# Patient Record
Sex: FEMALE | Race: WHITE | Employment: UNEMPLOYED | URBAN - NONMETROPOLITAN AREA
[De-identification: names, ages, dates, MRNs, and addresses within clinical notes are randomized per-mention and may not be internally consistent; named-entity substitution may affect disease eponyms.]

---

## 2022-02-01 ENCOUNTER — HOSPITAL ENCOUNTER (INPATIENT)
Age: 37
LOS: 6 days | Discharge: HOME | DRG: 885 | End: 2022-02-07
Attending: PSYCHIATRY & NEUROLOGY | Admitting: PSYCHIATRY & NEUROLOGY
Payer: COMMERCIAL

## 2022-02-01 PROCEDURE — 1240000000 HC EMOTIONAL WELLNESS R&B

## 2022-02-01 RX ORDER — OLANZAPINE 10 MG/1
10 TABLET ORAL NIGHTLY
Status: ON HOLD | COMMUNITY
End: 2022-02-07 | Stop reason: SDUPTHER

## 2022-02-01 RX ORDER — GABAPENTIN 600 MG/1
600 TABLET ORAL 3 TIMES DAILY
COMMUNITY

## 2022-02-01 RX ORDER — DULOXETIN HYDROCHLORIDE 30 MG/1
30 CAPSULE, DELAYED RELEASE ORAL DAILY
Status: ON HOLD | COMMUNITY
End: 2022-02-07 | Stop reason: HOSPADM

## 2022-02-01 RX ORDER — TRAZODONE HYDROCHLORIDE 50 MG/1
50 TABLET ORAL NIGHTLY
Status: ON HOLD | COMMUNITY
End: 2022-02-07 | Stop reason: SDUPTHER

## 2022-02-01 RX ORDER — ACETAMINOPHEN 325 MG/1
650 TABLET ORAL EVERY 4 HOURS PRN
Status: DISCONTINUED | OUTPATIENT
Start: 2022-02-01 | End: 2022-02-07 | Stop reason: HOSPADM

## 2022-02-01 RX ORDER — NICOTINE 21 MG/24HR
1 PATCH, TRANSDERMAL 24 HOURS TRANSDERMAL DAILY
Status: DISCONTINUED | OUTPATIENT
Start: 2022-02-01 | End: 2022-02-07 | Stop reason: HOSPADM

## 2022-02-01 RX ORDER — DOCUSATE SODIUM 100 MG/1
100 CAPSULE, LIQUID FILLED ORAL NIGHTLY
COMMUNITY

## 2022-02-01 RX ORDER — POLYETHYLENE GLYCOL 3350 17 G/17G
17 POWDER, FOR SOLUTION ORAL DAILY PRN
Status: DISCONTINUED | OUTPATIENT
Start: 2022-02-01 | End: 2022-02-07 | Stop reason: HOSPADM

## 2022-02-01 RX ORDER — ACETAMINOPHEN 325 MG/1
650 TABLET ORAL EVERY 4 HOURS PRN
Status: DISCONTINUED | OUTPATIENT
Start: 2022-02-01 | End: 2022-02-01

## 2022-02-01 RX ORDER — M-VIT,TX,IRON,MINS/CALC/FOLIC 27MG-0.4MG
1 TABLET ORAL DAILY
COMMUNITY

## 2022-02-01 RX ORDER — METRONIDAZOLE 500 MG/1
500 TABLET ORAL 2 TIMES DAILY
COMMUNITY

## 2022-02-01 ASSESSMENT — SLEEP AND FATIGUE QUESTIONNAIRES
DIFFICULTY STAYING ASLEEP: YES
RESTFUL SLEEP: YES
SLEEP PATTERN: DIFFICULTY FALLING ASLEEP;DISTURBED/INTERRUPTED SLEEP;RESTLESSNESS
DO YOU USE A SLEEP AID: YES
AVERAGE NUMBER OF SLEEP HOURS: 6
DIFFICULTY FALLING ASLEEP: YES
DIFFICULTY ARISING: NO
DO YOU HAVE DIFFICULTY SLEEPING: YES

## 2022-02-01 ASSESSMENT — LIFESTYLE VARIABLES: HISTORY_ALCOHOL_USE: NO

## 2022-02-01 ASSESSMENT — PATIENT HEALTH QUESTIONNAIRE - PHQ9: SUM OF ALL RESPONSES TO PHQ QUESTIONS 1-9: 18

## 2022-02-01 NOTE — GROUP NOTE
Group Therapy Note    Date: 2/1/2022    Group Start Time: 1600  Group End Time: 1700  Group Topic: Healthy Living/Wellness    [facility]    Matthew Conner RN                  Patient's Goal:  Safety Plan      Status After Intervention:  Improved    Participation Level: Active Listener and Interactive    Participation Quality: Appropriate and Attentive      Speech:  normal      Thought Process/Content: Logical  Linear      Affective Functioning: Congruent      Mood: anxious      Level of consciousness:  Alert and Oriented x4      Response to Learning: Able to verbalize current knowledge/experience and Able to verbalize/acknowledge new learning      Endings: None Reported    Modes of Intervention: Education      Discipline Responsible: Registered Nurse      Signature:   Matthew Conner RN
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
## 2022-02-02 PROBLEM — F33.3 SEVERE RECURRENT MAJOR DEPRESSION WITH PSYCHOTIC FEATURES (HCC): Status: ACTIVE | Noted: 2022-02-02

## 2022-02-02 PROBLEM — F12.20 CANNABIS USE DISORDER, MODERATE, DEPENDENCE (HCC): Status: ACTIVE | Noted: 2022-02-02

## 2022-02-02 PROCEDURE — 6370000000 HC RX 637 (ALT 250 FOR IP): Performed by: NURSE PRACTITIONER

## 2022-02-02 PROCEDURE — 1240000000 HC EMOTIONAL WELLNESS R&B

## 2022-02-02 PROCEDURE — 6370000000 HC RX 637 (ALT 250 FOR IP): Performed by: PSYCHIATRY & NEUROLOGY

## 2022-02-02 PROCEDURE — 90792 PSYCH DIAG EVAL W/MED SRVCS: CPT | Performed by: NURSE PRACTITIONER

## 2022-02-02 RX ORDER — GABAPENTIN 600 MG/1
600 TABLET ORAL 3 TIMES DAILY
Status: DISCONTINUED | OUTPATIENT
Start: 2022-02-02 | End: 2022-02-02

## 2022-02-02 RX ORDER — GABAPENTIN 600 MG/1
600 TABLET ORAL 2 TIMES DAILY
Status: DISCONTINUED | OUTPATIENT
Start: 2022-02-02 | End: 2022-02-02

## 2022-02-02 RX ORDER — TRAZODONE HYDROCHLORIDE 50 MG/1
50 TABLET ORAL NIGHTLY
Status: DISCONTINUED | OUTPATIENT
Start: 2022-02-02 | End: 2022-02-07 | Stop reason: HOSPADM

## 2022-02-02 RX ORDER — DOCUSATE SODIUM 100 MG/1
100 CAPSULE, LIQUID FILLED ORAL NIGHTLY
Status: DISCONTINUED | OUTPATIENT
Start: 2022-02-02 | End: 2022-02-07 | Stop reason: HOSPADM

## 2022-02-02 RX ORDER — OLANZAPINE 10 MG/1
10 TABLET ORAL NIGHTLY
Status: DISCONTINUED | OUTPATIENT
Start: 2022-02-02 | End: 2022-02-07 | Stop reason: HOSPADM

## 2022-02-02 RX ORDER — IBUPROFEN 400 MG/1
400 TABLET ORAL EVERY 6 HOURS PRN
Status: DISCONTINUED | OUTPATIENT
Start: 2022-02-02 | End: 2022-02-07 | Stop reason: HOSPADM

## 2022-02-02 RX ORDER — DULOXETIN HYDROCHLORIDE 30 MG/1
30 CAPSULE, DELAYED RELEASE ORAL DAILY
Status: DISCONTINUED | OUTPATIENT
Start: 2022-02-02 | End: 2022-02-07 | Stop reason: HOSPADM

## 2022-02-02 RX ADMIN — IBUPROFEN 400 MG: 400 TABLET, FILM COATED ORAL at 14:34

## 2022-02-02 RX ADMIN — IBUPROFEN 400 MG: 400 TABLET, FILM COATED ORAL at 20:53

## 2022-02-02 RX ADMIN — ACETAMINOPHEN 650 MG: 325 TABLET ORAL at 07:52

## 2022-02-02 RX ADMIN — OLANZAPINE 10 MG: 10 TABLET, FILM COATED ORAL at 20:50

## 2022-02-02 RX ADMIN — DULOXETINE 30 MG: 30 CAPSULE, DELAYED RELEASE ORAL at 10:59

## 2022-02-02 RX ADMIN — GABAPENTIN 600 MG: 600 TABLET, FILM COATED ORAL at 13:25

## 2022-02-02 RX ADMIN — DOCUSATE SODIUM 100 MG: 100 CAPSULE, LIQUID FILLED ORAL at 20:51

## 2022-02-02 RX ADMIN — TRAZODONE HYDROCHLORIDE 50 MG: 50 TABLET ORAL at 20:50

## 2022-02-02 ASSESSMENT — PAIN DESCRIPTION - LOCATION: LOCATION: BACK

## 2022-02-02 ASSESSMENT — PAIN SCALES - GENERAL
PAINLEVEL_OUTOF10: 6
PAINLEVEL_OUTOF10: 4
PAINLEVEL_OUTOF10: 2
PAINLEVEL_OUTOF10: 5

## 2022-02-02 ASSESSMENT — PAIN DESCRIPTION - ORIENTATION: ORIENTATION: LOWER

## 2022-02-02 ASSESSMENT — PAIN DESCRIPTION - ONSET: ONSET: ON-GOING

## 2022-02-02 ASSESSMENT — PAIN DESCRIPTION - PAIN TYPE: TYPE: ACUTE PAIN

## 2022-02-02 ASSESSMENT — PAIN - FUNCTIONAL ASSESSMENT: PAIN_FUNCTIONAL_ASSESSMENT: ACTIVITIES ARE NOT PREVENTED

## 2022-02-02 ASSESSMENT — PAIN DESCRIPTION - DESCRIPTORS: DESCRIPTORS: ACHING

## 2022-02-02 ASSESSMENT — PAIN DESCRIPTION - FREQUENCY: FREQUENCY: INTERMITTENT

## 2022-02-02 ASSESSMENT — PAIN DESCRIPTION - PROGRESSION: CLINICAL_PROGRESSION: NOT CHANGED

## 2022-02-02 NOTE — PLAN OF CARE
Problem: Depressive Behavior With or Without Suicide Precautions:  Goal: Able to verbalize acceptance of life and situations over which he or she has no control  2/2/2022 1208 by Meche Horan  Outcome: Ongoing      Group Therapy Note     Date: 2/2/2022  Start Time: 1100  End Time:  1130  Number of Participants: 9     Type of Group: Psychoeducation     Wellness Binder Information  Module Name:  emotional wellness  Session Number:  1     Patient's Goal:  validation of feelings     Notes:  pt acknowledged to have feelings validated it may be necessary to share feeling with others.      Status After Intervention:  Improved     Participation Level: Interactive     Participation Quality: Appropriate, Attentive, and Sharing        Speech:  normal        Thought Process/Content: Logical        Affective Functioning: Congruent        Mood: congruent        Level of consciousness:  Alert, Oriented x4, and Attentive        Response to Learning: Able to verbalize current knowledge/experience        Endings: None Reported     Modes of Intervention: Education        Discipline Responsible: Psychoeducational Specialist        Signature:  Meche Horan

## 2022-02-02 NOTE — PROGRESS NOTES
SW placed a call to get collateral from patients mom Faisal Colin and there was not a voice mail set up to leave a message at [phone]

## 2022-02-02 NOTE — PROGRESS NOTES
Called patient's pharmacy as per provider and double checked patient's prescription for Neurontin. Pharmacist confirmed patient last picked up medication on 12/31/2021.

## 2022-02-02 NOTE — H&P
Behavioral Services  Medicare Certification Upon Admission    I certify that this patient's inpatient psychiatric hospital admission is medically necessary for:    [x] (1) Treatment which could reasonably be expected to improve this patient's condition,       [] (2) Or for diagnostic study;     AND     [x](2) The inpatient psychiatric services are provided while the individual is under the care of a physician and are included in the individualized plan of care.     Estimated length of stay/service 3 days- 5 weeks    Plan for post-hospital care: TBA    Electronically signed by ALAN Smiley on 2/2/2022 at 10:53 AM

## 2022-02-02 NOTE — PLAN OF CARE
Problem: Depressive Behavior With or Without Suicide Precautions:  Goal: Absence of self-harm  Description: Absence of self-harm  Outcome: Met This Shift     Problem: Depressive Behavior With or Without Suicide Precautions:  Goal: Able to verbalize acceptance of life and situations over which he or she has no control  Description: Able to verbalize acceptance of life and situations over which he or she has no control  Outcome: Ongoing  Goal: Able to verbalize and/or display a decrease in depressive symptoms  Description: Able to verbalize and/or display a decrease in depressive symptoms  Outcome: Ongoing  Goal: Ability to disclose and discuss suicidal ideas will improve  Description: Ability to disclose and discuss suicidal ideas will improve  Outcome: Ongoing  Goal: Able to verbalize support systems  Description: Able to verbalize support systems  Outcome: Ongoing  Goal: Patient specific goal  Description: Patient specific goal  Outcome: Ongoing  Goal: Participates in care planning  Description: Participates in care planning  Outcome: Ongoing     Problem: Pain:  Goal: Pain level will decrease  Description: Pain level will decrease  Outcome: Ongoing  Goal: Control of acute pain  Description: Control of acute pain  Outcome: Ongoing  Goal: Control of chronic pain  Description: Control of chronic pain  Outcome: Ongoing

## 2022-02-02 NOTE — PROGRESS NOTES
Group Therapy Note    Start Time: 800  End Time:  651  Number of Participants: 9    Type of Group: Community Meeting       Patient's Goal:  \"resting\"         Notes:      Participation Level:  Active Listener       Participation Quality: Appropriate      Thought Process/Content: Logical      Affective Functioning: Congruent      Mood: calm      Level of consciousness:  Alert      Modes of Intervention: Support      Discipline Responsible: Behavioral Health Tech II      Signature:  Temo Chambers

## 2022-02-02 NOTE — PROGRESS NOTES
[facility] Adult Unit Daily Assessment  Nursing Progress Note    Room: [facility]/606-01   Name: Kaylah Hoffman   Age: 29 y.o. Gender: female   Dx: <principal problem not specified>  Precautions: suicide risk  Inpatient Status: voluntary       SLEEP:    Sleep Quality Fair  Sleep Medications: No   PRN Sleep Meds: No       MEDICAL:    Other PRN Meds: No   Med Compliant: Yes  Accu-Chek: No  Oxygen/CPAP/BiPAP: No  CIWA/CINA: No   PAIN Assessment: none  Side Effects from medication: No    Is Patient experiencing any respiratory symptoms (headache, fever, body aches, cough. Abdi Fernandez ): no  Patient educated by nursing to practice social distancing, wear masks, wash hands frequently: yes      PSYCH:    Depression: 3   Anxiety: 3   SI denies suicidal ideation   HI Negative for homicidal ideation      AVH:Absent      GENERAL:    Appetite: no change from normal    Social: No   Speech: normal   Appearance: appropriately dressed    GROUP:    Group Participation: No  Participation Quality: None    Notes:   Patient has remained in her room most of the shift. Patient stated she was having increased depression as her reason for being a patient. Patient has not complained of any pain and did not have any medications ordered tonight. Patient sat up on the side of the bed and talked for a short time with this nurse and then laid back down and is resting in bed at this time with eyes closed. Will continue to monitor for safety.           Electronically signed by Oscar Jang RN on 2/1/22 at 11:20 PM CST

## 2022-02-02 NOTE — PLAN OF CARE
Problem: Depressive Behavior With or Without Suicide Precautions:  Goal: Able to verbalize acceptance of life and situations over which he or she has no control  Description: Able to verbalize acceptance of life and situations over which he or she has no control  2/2/2022 1501 by Donny Curry RN  Outcome: Ongoing  2/2/2022 1208 by Ralph Velez  Outcome: Ongoing  Goal: Able to verbalize and/or display a decrease in depressive symptoms  Description: Able to verbalize and/or display a decrease in depressive symptoms  Outcome: Ongoing  Goal: Ability to disclose and discuss suicidal ideas will improve  Description: Ability to disclose and discuss suicidal ideas will improve  Outcome: Ongoing  Goal: Able to verbalize support systems  Description: Able to verbalize support systems  Outcome: Ongoing  Goal: Absence of self-harm  Description: Absence of self-harm  Outcome: Ongoing  Goal: Patient specific goal  Description: Patient specific goal  Outcome: Ongoing  Goal: Participates in care planning  Description: Participates in care planning  Outcome: Ongoing     Problem: Pain:  Goal: Pain level will decrease  Description: Pain level will decrease  Outcome: Ongoing  Goal: Control of acute pain  Description: Control of acute pain  Outcome: Ongoing  Goal: Control of chronic pain  Description: Control of chronic pain  Outcome: Ongoing

## 2022-02-02 NOTE — PROGRESS NOTES
Requirement Note     SW met with pt to complete Psychosocial and CSSR-S on this date. Patients long and short term goals discussed. Patient voiced understanding. Treatment plan sheet signed. Patient verbalized understanding of the treatment plan. Patient participated in goals and objectives of the treatment plan. Patient discussed safety plan with . SW explained treatment goals with pt. Decreasing depression and anxiety by improvement of positive coping patterns was discussed. Pt acknowledged understanding of treatment goals and signed treatment plan signature sheet. In the last 6 months has the pt been a danger to self: YES  In the last 6 months has the pt been a danger to others: NO  Legal Guardian/POA: NO     Provided patient with [employer] handout entitled \"Quitting Smoking. \"  Reviewed handout with patient: addressing dangers of smoking, developing coping skills, and providing basic information about quitting. Patient received all components practical counseling of tobacco practical counseling during the hospital stay.

## 2022-02-02 NOTE — PROGRESS NOTES
BHI Daily Shift Assessment  Nursing Progress Note    Room: 0606/606-01 Name: Osiris Powers Age: 29 y.o. Gender: female   Dx: Severe recurrent major depression with psychotic features ([address]  Precautions: suicide risk  Target Symptoms:   Accu-Chek: NoSleep: Yes,Sleep Quality Good SI No AVH Denies   [facility]  ADLs: No Speech: normal Depression: 3 Anxiety: 4   Participation LevelActive Listener and Interactive  Appetite: Good  Respiratory symptoms: No Headache: No Body aches: No Fever: No Cough: No  Patients encouraged to wear masks, wash hands frequently and practice social distancing while on the unit: Yes  Visitation: No   Participation QualityAppropriate and Attentive    Complaints:    Notes: patient stated that she was feeling depressed and when she feels like this she goes to bed and takes a nap. This is what patient was doing during the afternoon after lunch. Interviewed patient in her room. Patient rates her depression as a 3 and her anxiety as a 4. Patient says that her nap helped her depression. Patient denies SI, HI and AVH. Patient says her appetite was good and she had a good night's sleep due to the zyprexa she took. Will continue to monitor for safety.      Signature:

## 2022-02-02 NOTE — PROGRESS NOTES
Admission Note      Reason for admission/Target Symptom: Patient admitted to [facility] due to under the care of Maude Mendoza  arrived on unit via [facility] with security and staff from [place] Patient arrived dressed in paper scrubs:  no.   Body assessment and safety check completed by Haja Mcnamara and Janny Aguilera. and  yes umer discovered shoes with string, pants with string, and hoodie with string  Pt was provided with paper scrubs and clothes from donation closet. Patient belongings and valuables was cataloged and accounted for by Charla MILTON  Diagnoses: Depression NOS  UDS: Amphetamine, Methampetamine, THC  BAL:  Neg    SW met with treatment team to discuss patient's treatment including care planning, discharge planning, and follow-up needs. Pt has been admitted to [facility]. Treatment team has identified patient's discharge needs as medication management and outpatient therapy/counseling. Pt confirmed  the need for ongoing treatment post inpatient stay. Pt was also provided a handout of contact information for drug and alcohol treatment centers and other community support service such as [facility], AA, and [facility].

## 2022-02-03 LAB
HCG(URINE) PREGNANCY TEST: NEGATIVE
TSH REFLEX FT4: 2.32 UIU/ML (ref 0.35–5.5)
VITAMIN B-12: 709 PG/ML (ref 211–946)
VITAMIN D 25-HYDROXY: 20.7 NG/ML

## 2022-02-03 PROCEDURE — 6370000000 HC RX 637 (ALT 250 FOR IP): Performed by: NURSE PRACTITIONER

## 2022-02-03 PROCEDURE — 99231 SBSQ HOSP IP/OBS SF/LOW 25: CPT | Performed by: NURSE PRACTITIONER

## 2022-02-03 PROCEDURE — 6370000000 HC RX 637 (ALT 250 FOR IP): Performed by: FAMILY MEDICINE

## 2022-02-03 PROCEDURE — 82306 VITAMIN D 25 HYDROXY: CPT

## 2022-02-03 PROCEDURE — 84703 CHORIONIC GONADOTROPIN ASSAY: CPT

## 2022-02-03 PROCEDURE — 1240000000 HC EMOTIONAL WELLNESS R&B

## 2022-02-03 PROCEDURE — 6370000000 HC RX 637 (ALT 250 FOR IP): Performed by: PSYCHIATRY & NEUROLOGY

## 2022-02-03 PROCEDURE — 84443 ASSAY THYROID STIM HORMONE: CPT

## 2022-02-03 PROCEDURE — 36415 COLL VENOUS BLD VENIPUNCTURE: CPT

## 2022-02-03 PROCEDURE — 82607 VITAMIN B-12: CPT

## 2022-02-03 RX ORDER — M-VIT,TX,IRON,MINS/CALC/FOLIC 27MG-0.4MG
1 TABLET ORAL DAILY
Status: DISCONTINUED | OUTPATIENT
Start: 2022-02-03 | End: 2022-02-07 | Stop reason: HOSPADM

## 2022-02-03 RX ORDER — ERGOCALCIFEROL 1.25 MG/1
50000 CAPSULE ORAL WEEKLY
Status: DISCONTINUED | OUTPATIENT
Start: 2022-02-03 | End: 2022-02-07 | Stop reason: HOSPADM

## 2022-02-03 RX ADMIN — ERGOCALCIFEROL 50000 UNITS: 1.25 CAPSULE ORAL at 10:24

## 2022-02-03 RX ADMIN — Medication 1 TABLET: at 12:15

## 2022-02-03 RX ADMIN — TRAZODONE HYDROCHLORIDE 50 MG: 50 TABLET ORAL at 20:10

## 2022-02-03 RX ADMIN — IBUPROFEN 400 MG: 400 TABLET, FILM COATED ORAL at 13:59

## 2022-02-03 RX ADMIN — OLANZAPINE 10 MG: 10 TABLET, FILM COATED ORAL at 20:09

## 2022-02-03 RX ADMIN — DULOXETINE 30 MG: 30 CAPSULE, DELAYED RELEASE ORAL at 08:28

## 2022-02-03 RX ADMIN — IBUPROFEN 400 MG: 400 TABLET, FILM COATED ORAL at 20:15

## 2022-02-03 RX ADMIN — POLYETHYLENE GLYCOL 3350 17 G: 17 POWDER, FOR SOLUTION ORAL at 12:15

## 2022-02-03 RX ADMIN — IBUPROFEN 400 MG: 400 TABLET, FILM COATED ORAL at 08:30

## 2022-02-03 RX ADMIN — DOCUSATE SODIUM 100 MG: 100 CAPSULE, LIQUID FILLED ORAL at 20:10

## 2022-02-03 ASSESSMENT — PAIN DESCRIPTION - DESCRIPTORS
DESCRIPTORS: ACHING
DESCRIPTORS: DISCOMFORT;DULL
DESCRIPTORS: DISCOMFORT;DULL

## 2022-02-03 ASSESSMENT — PAIN - FUNCTIONAL ASSESSMENT
PAIN_FUNCTIONAL_ASSESSMENT: ACTIVITIES ARE NOT PREVENTED

## 2022-02-03 ASSESSMENT — PAIN DESCRIPTION - PAIN TYPE
TYPE: ACUTE PAIN

## 2022-02-03 ASSESSMENT — PAIN DESCRIPTION - ORIENTATION
ORIENTATION: POSTERIOR
ORIENTATION: LEFT;UPPER
ORIENTATION: POSTERIOR

## 2022-02-03 ASSESSMENT — PAIN DESCRIPTION - ONSET
ONSET: ON-GOING

## 2022-02-03 ASSESSMENT — PAIN DESCRIPTION - FREQUENCY
FREQUENCY: INTERMITTENT

## 2022-02-03 ASSESSMENT — PAIN DESCRIPTION - LOCATION
LOCATION: TEETH
LOCATION: NECK
LOCATION: NECK

## 2022-02-03 ASSESSMENT — PAIN SCALES - GENERAL
PAINLEVEL_OUTOF10: 5
PAINLEVEL_OUTOF10: 4
PAINLEVEL_OUTOF10: 4
PAINLEVEL_OUTOF10: 2

## 2022-02-03 ASSESSMENT — PAIN DESCRIPTION - PROGRESSION
CLINICAL_PROGRESSION: NOT CHANGED
CLINICAL_PROGRESSION: GRADUALLY WORSENING
CLINICAL_PROGRESSION: GRADUALLY IMPROVING

## 2022-02-03 NOTE — PLAN OF CARE
Problem: Depressive Behavior With or Without Suicide Precautions:  Goal: Able to verbalize acceptance of life and situations over which he or she has no control  Description: Able to verbalize acceptance of life and situations over which he or she has no control  2/3/2022 1331 by Leanne Aschoff, RN  Outcome: Ongoing  2/3/2022 1218 by Ruth Hagen LPC  Outcome: Ongoing  2/3/2022 0536 by Chasidy Thompson RN  Outcome: Ongoing  Goal: Able to verbalize and/or display a decrease in depressive symptoms  Description: Able to verbalize and/or display a decrease in depressive symptoms  2/3/2022 1331 by Leanne Aschoff, RN  Outcome: Ongoing  2/3/2022 0536 by Chasidy Thompson RN  Outcome: Ongoing  Goal: Ability to disclose and discuss suicidal ideas will improve  Description: Ability to disclose and discuss suicidal ideas will improve  2/3/2022 1331 by Leanne Aschoff, RN  Outcome: Ongoing  2/3/2022 0536 by Chasidy Thompson RN  Outcome: Ongoing  Goal: Able to verbalize support systems  Description: Able to verbalize support systems  2/3/2022 1331 by Leanne Aschoff, RN  Outcome: Ongoing  2/3/2022 0536 by Chasidy Thompson RN  Outcome: Ongoing  Goal: Absence of self-harm  Description: Absence of self-harm  2/3/2022 1331 by Leanne Aschoff, RN  Outcome: Ongoing  2/3/2022 0536 by Chaisdy Thompson RN  Outcome: Ongoing  Goal: Patient specific goal  Description: Patient specific goal  2/3/2022 1331 by Leanne Aschoff, RN  Outcome: Ongoing  2/3/2022 0536 by Chasidy Thompson RN  Outcome: Ongoing  Goal: Participates in care planning  Description: Participates in care planning  2/3/2022 1331 by Leanne Aschoff, RN  Outcome: Ongoing  2/3/2022 0536 by Chasidy Thompson RN  Outcome: Ongoing

## 2022-02-03 NOTE — PROGRESS NOTES
History     Substance and Sexual Activity   Drug Use Not Currently        Social History     Tobacco Use   Smoking Status Current Every Day Smoker    Packs/day: 1.00    Types: Cigarettes   Smokeless Tobacco Never Used        Family History:     No family history on file. Mental Status:  Level of consciousness:  within normal limits and awake  Appearance:  well-appearing, street clothes, in chair, good grooming and good hygiene  Behavior/Motor:  no abnormalities noted  Attitude toward examiner:  cooperative, attentive and good eye contact  Speech:  normal rate and normal volume  Mood:  \" I am good. \"  Affect:  blunted and flat  Thought processes:  linear and goal directed  Thought content:  Homocidal ideation :deniess  Suicidal Ideation:  denies suicidal ideation  Delusions:  no evidence of delusions  Perceptual Disturbance:  denies any perceptual disturbance  Cognition:  oriented to person, place, and time  Concentration : good  Memory intact for recent and remote  Fund of knowledge: average  Abstract thinking:  adequate  Insight: improved  Judgment:  good     vitamin D  50,000 Units Oral Weekly    docusate sodium  100 mg Oral Nightly    DULoxetine  30 mg Oral Daily    OLANZapine  10 mg Oral Nightly    traZODone  50 mg Oral Nightly    nicotine  1 patch TransDERmal Daily       Current Medications:  Current Facility-Administered Medications   Medication Dose Route Frequency Provider Last Rate Last Admin    vitamin D (ERGOCALCIFEROL) capsule 50,000 Units  50,000 Units Oral Weekly Abdelrahman Regan MD   50,000 Units at 02/03/22 1024    docusate sodium (COLACE) capsule 100 mg  100 mg Oral Nightly Phyllis Console, APRN   100 mg at 02/02/22 2051    DULoxetine (CYMBALTA) extended release capsule 30 mg  30 mg Oral Daily Phyllis Console, APRN   30 mg at 02/03/22 0828    OLANZapine (ZYPREXA) tablet 10 mg  10 mg Oral Nightly Phyllis Console, APRN   10 mg at 02/02/22 2050    traZODone ([facility]) tablet 50 mg  50 mg Oral Nightly ALAN Tucker   50 mg at 02/02/22 2050    ibuprofen (ADVIL;MOTRIN) tablet 400 mg  400 mg Oral Q6H PRN ALAN Tucker   400 mg at 02/03/22 0830    polyethylene glycol (GLYCOLAX) packet 17 g  17 g Oral Daily PRN Tico Lopez MD        nicotine (NICODERM CQ) 21 MG/24HR 1 patch  1 patch TransDERmal Daily Tico Lopez MD   1 patch at 02/02/22 0752    acetaminophen (TYLENOL) tablet 650 mg  650 mg Oral Q4H PRN Portia Lyon MD   650 mg at 02/02/22 8653       Psychotherapy:   SUPPORTIVE    DSM V Diagnoses:      Principal Problem:    Severe recurrent major depression with psychotic features ([address]  Active Problems:    Cannabis use disorder, moderate, dependence ([address]  Resolved Problems:    * No resolved hospital problems. *            Plan:    Encourage group therapy  15 minute safety checks  Medical monitoring by Dr. Ewing Galeazzi and associates  Continue current therapy and medications  Possible dc Monday- pt reports social work assisting with getting patient to [facility] and [facility] in [address]? Amount of time spent with patient:  15 minutes with greater than 50% of the time spent in counseling and collaboration of care.     ALAN Tucker  Clinician Signature: signed electronically

## 2022-02-03 NOTE — PROGRESS NOTES
Group Therapy Note     Date: 02/03/22  Start Time: 0800  End Time:0830     Number of Participants: 13/15     Type of Group: community morning group        Status After Intervention:  Improved     Participation Level:  Active Listener and Interactive     Participation Quality: Appropriate and Attentive     Speech:  normal     Thought Process/Content: Logical  Linear     Affective Functioning: Congruent     Mood: calm     Level of consciousness:  Oriented x4     Response to Learning: Able to verbalize current knowledge/experience, Able to verbalize/acknowledge new learning, Able to retain information and Capable of insight     Modes of Intervention: Education and Support     Discipline Responsible: Registered Nurse     Signature:  Garry Aguiar RN

## 2022-02-03 NOTE — PLAN OF CARE
Problem: Depressive Behavior With or Without Suicide Precautions:  Goal: Able to verbalize acceptance of life and situations over which he or she has no control  Description: Able to verbalize acceptance of life and situations over which he or she has no control  2/3/2022 1218 by Jordyn Maguire LPC  Outcome: Ongoing  2/3/2022 0536 by Tiffany Morocho RN  Outcome: Ongoing     Group Therapy Note     Date: 2/3/2022  Start Time: 1100  End Time:  1045  Number of Participants: 7     Type of Group: Psychotherapy     Patient's Goal: Patient will process emotions and actions and how to relate to other or their with others. Patient discussed open communication and feelings and emotions. Notes:  Patient attended process group as scheduled, patient identified a issue to work on today regarding how they will interact and relate to others. Status After Intervention:  Improved     Participation Level:  Active Listener     Participation Quality: Appropriate, Attentive, and Sharing        Speech:  normal        Thought Process/Content: Logical        Affective Functioning: Congruent        Mood: euthymic        Level of consciousness:  Alert        Response to Learning: Able to verbalize current knowledge/experience        Endings: None Reported     Modes of Intervention: Education, Support, and Socialization        Discipline Responsible: /Counselor        Signature:  Andrea Jennings Normal

## 2022-02-03 NOTE — BH NOTE
WRAP UP GROUP NOTE     Patient's Goal:  Providing feedback as to their own progress in the care-plan provided. Patients have an opportunity to explore self-reflective skills and share any additional cares and concerns not yet addressed.  Pt effectively participated.     Energy Level:normal  Appetite:normal  Concentration:normal  Hallucinations:gone  Depression:improved  Anxiety:on/off, improved  How I worked today:worked hard  What helps me sleep:read  Any questions/complaints/comments:  \"no\"     Group/activities that helped me today were:     Community/Goals  Therapeutic Recreation  Relaxation Training     Electronically signed by Altagracia Ramsey RN on 2/2/2022 at 8:15 PM

## 2022-02-03 NOTE — PROGRESS NOTES
[facility] Adult Unit Daily Assessment  Nursing Progress Note    Room: [facility]/606-01   Name: Jerica Zuñiga   Age: 29 y.o. Gender: female   Dx: Severe recurrent major depression with psychotic features ([facility] [address]  Precautions: suicide risk  Inpatient Status: voluntary       SLEEP:    Sleep Quality Fair  Sleep Medications:    PRN Sleep Meds:        MEDICAL:    Other PRN Meds:   Med Compliant:  Accu-Chek: No  Oxygen/CPAP/BiPAP: No  CIWA/CINA:   PAIN Assessment: none  Side Effects from medication: No    Is Patient experiencing any respiratory symptoms (headache, fever, body aches, cough. Gweneth Latch ): no  Patient educated by nursing to practice social distancing, wear masks, wash hands frequently: yes      PSYCH:    Depression: 2  Anxiety:  2   SI denies suicidal ideation   HI Negative for homicidal ideation      AVH:Absent      GENERAL:    Appetite: good    Social: No   Speech: normal   Appearance: appropriately dressed and healthy looking    GROUP:    Group Participation: Yes  Participation Quality: Minimal    Notes: Patient reports trouble falling asleep, nightmares. awaken early and couldn't get back to sleep. Miralax and multivitamin given. Patient plans on return to her mothers house. She came to the desk at 1400 and ask for Gabapentin. She seemed surprised to learn that it had been discontinued. She ask for Motrin for pain in her cervical neck Rated at 4.          Electronically signed by Viviana Lacey RN on 2/3/22 at 1:13 PM CST

## 2022-02-03 NOTE — PROGRESS NOTES
[facility] Adult Unit Daily Assessment  Nursing Progress Note    Room: [facility]/606-01   Name: Deya Phan   Age: 29 y.o. Gender: female   Dx: Severe recurrent major depression with psychotic features ([address]  Precautions: suicide risk  Inpatient Status: voluntary       SLEEP:    Sleep Quality Good  Sleep Medications: Yes , trazodone 50 mg  PRN Sleep Meds: No       MEDICAL:    Other PRN Meds: Yes , motrin 400mg  Med Compliant: Yes  Accu-Chek: No  Oxygen/CPAP/BiPAP: No  CIWA/CINA: No   PAIN Assessment: present - adequately treated  Side Effects from medication: No    Is Patient experiencing any respiratory symptoms (headache, fever, body aches, cough. Whitt Kid ): no  Patient educated by nursing to practice social distancing, wear masks, wash hands frequently: yes      PSYCH:    Depression: 2   Anxiety: 0   SI denies suicidal ideation   HI Negative for homicidal ideation      AVH:Absent      GENERAL:    Appetite: good    Social: Yes   Speech: normal   Appearance: appropriately dressed and healthy looking    GROUP:    Group Participation: Yes  Participation Quality: Interactive    Notes:  Patient was in the dayroom watching television with her peers. Patient had a flat facial expression, withdrawn, and fair eye contact during the assessment. Patient stated that she wants to go to [employer]. Motrin 400 mg was given. Continue to monitor or safety.         Electronically signed by Almas Espinoza RN on 2/3/22 at 5:48 AM CST

## 2022-02-03 NOTE — PROGRESS NOTES
SW met with treatment team to discuss pt's progress and setbacks. SW 2 was present. Pt reportedly slept well last night, with medications, appetite is good, attends scheduled group activities, social with peers/staff, independent with ADLS, compliant with medications, behavior has been cooperative, flat affect, fair eye contact, reports mild depression, denies anxiety, denies SI/HI/AVH, tentative discharge Monday, continue current treatment plan.

## 2022-02-03 NOTE — PROGRESS NOTES
Progress Note  Ivette Berriosies  2/3/2022 12:35 PM  Subjective:   Admit Date:   2/1/2022      CC/ADMIT DX:       Interval History:   Reviewed overnight events and nursing notes. She has no new medical issues. I have reviewed all labs/diagnostics from the last 24hrs. ROS:   I have done a 10 point ROS and all are negative, except what is mentioned in the HPI. ADULT DIET; Regular    Medications:      vitamin D  50,000 Units Oral Weekly    therapeutic multivitamin-minerals  1 tablet Oral Daily    docusate sodium  100 mg Oral Nightly    DULoxetine  30 mg Oral Daily    OLANZapine  10 mg Oral Nightly    traZODone  50 mg Oral Nightly    nicotine  1 patch TransDERmal Daily           Objective:   Vitals: BP (!) 135/99   Pulse 63   Temp 97.7 °F (36.5 °C) (Temporal)   Resp 16   Wt 135 lb (61.2 kg)   SpO2 99%   Breastfeeding No  No intake or output data in the 24 hours ending 02/03/22 1235  General appearance: alert and cooperative with exam  Extremities: extremities normal, atraumatic, no cyanosis or edema  Neurologic:  No obvious focal neurologic deficits. Skin: no rashes    Assessment and Plan:   Principal Problem:    Severe recurrent major depression with psychotic features ([facility] [address]  Active Problems:    Cannabis use disorder, moderate, dependence ([facility])  Resolved Problems:    * No resolved hospital problems. *    Vit D Def    Plan:  1. Continue present medication(s)   2. Replace Vit D  3. Follow with Psych      Discharge planning:   her home     Reviewed treatment plans with the patient and/or family.              Electronically signed by Venkat Colunga MD on 2/3/2022 at 12:35 PM

## 2022-02-03 NOTE — FLOWSHEET NOTE
02/03/22 1133   Encounter Summary   Services provided to: Patient   Referral/Consult From: Physician  (Consult:  Spiritual distress)   Support System Children;Parent   Complexity of Encounter Moderate   Length of Encounter 30 minutes   Spiritual/Confucianism   Type Spiritual struggle   Assessment Concerns with suffering;Helplessness   Intervention Active listening;Explored feelings, thoughts, concerns   Outcome Encouraged;New perspective     S:  Patient stated \"I worry about my parents, children\"  Mentioned being \"Homeless. \"  O:  Patient appeared calm, articulate and engaging. A:  Patient expressed an image of g-d as loving, man/father; seems to identify parents & children as source of meaning/purpose; her current \"homeless\" situation may be a cause, among others-of sadness/depression=suggesting-her powerlessness to provide a 'home' as a mother. Seems unable to use her image of g-d as another source of support/strength. Appreciative of spiritual care. P: Spiritual Care' attentive listening.     Electronically signed by Anoop Hayes  [employer] on 2/3/2022 at 11:57 AM I personally performed the service described in the documentation recorded by the scribe in my presence, and it accurately and completely records my words and actions.

## 2022-02-03 NOTE — PROGRESS NOTES
Treatment Team Note:    HALIE met with [id number] [id number] team to discuss Pts [address] plans. Progress/Behavior/Group Attendance: TBD    Target Symptoms/Reason for admission: Patient admitted to [facility] due to under the care of   arrived on unit via [facility] with security and staff from [employer] Patient arrived dressed in paper scrubs:  no.   Body assessment and safety check completed by Megan and Ilan Wilson  yes contraband discovered shoes with string, pants with string, and hoodie with string  Pt was provided with paper scrubs and clothes from donation closet.    Patient belongings and valuables was cataloged and accounted for by Neeru MILTON  Diagnoses: Severe recurrent major depression with psychotic features   Cannabis use disorder, moderate, dependence , Stimulant use disorder   UDS: Amphetamine, Methampetamine, THC  BAL:  Neg       AftercarePlan: [address] lives with: HALIE will meet with pt to gather information. Collateral obtained from: SW will meet with pt to gather release of information.   On:    Family Session: MCKENZIE    Misc:

## 2022-02-03 NOTE — PLAN OF CARE
Group Therapy Note    Date: 2/3/2022  Start Time: 1000  End Time:  1030  Number of Participants: 9    Type of Group: Psychoeducation    Wellness Binder Information  Module Name:  Men's Issues  Session Number:  1    Group Goal for Pt: To improve knowledge of effective stress management techniques    Notes:  Pt did not attend group discussion. Pt was invited/encouraged. Status After Intervention:      Participation Level:     Participation Quality:       Speech:         Thought Process/Content:       Affective Functioning:       Mood:       Level of consciousness:        Response to Learning:       Endings:     Modes of Intervention:       Discipline Responsible:       Signature:  Nyla Goel

## 2022-02-04 PROCEDURE — 6370000000 HC RX 637 (ALT 250 FOR IP): Performed by: NURSE PRACTITIONER

## 2022-02-04 PROCEDURE — 6370000000 HC RX 637 (ALT 250 FOR IP): Performed by: PSYCHIATRY & NEUROLOGY

## 2022-02-04 PROCEDURE — 99233 SBSQ HOSP IP/OBS HIGH 50: CPT | Performed by: PSYCHIATRY & NEUROLOGY

## 2022-02-04 PROCEDURE — 1240000000 HC EMOTIONAL WELLNESS R&B

## 2022-02-04 RX ORDER — HYDROXYZINE HYDROCHLORIDE 25 MG/1
25 TABLET, FILM COATED ORAL 3 TIMES DAILY PRN
Status: DISCONTINUED | OUTPATIENT
Start: 2022-02-04 | End: 2022-02-07 | Stop reason: HOSPADM

## 2022-02-04 RX ORDER — LANOLIN ALCOHOL/MO/W.PET/CERES
3 CREAM (GRAM) TOPICAL NIGHTLY
Status: DISCONTINUED | OUTPATIENT
Start: 2022-02-04 | End: 2022-02-07 | Stop reason: HOSPADM

## 2022-02-04 RX ADMIN — OLANZAPINE 10 MG: 10 TABLET, FILM COATED ORAL at 20:16

## 2022-02-04 RX ADMIN — Medication 1 TABLET: at 08:17

## 2022-02-04 RX ADMIN — DOCUSATE SODIUM 100 MG: 100 CAPSULE, LIQUID FILLED ORAL at 20:15

## 2022-02-04 RX ADMIN — IBUPROFEN 400 MG: 400 TABLET, FILM COATED ORAL at 14:55

## 2022-02-04 RX ADMIN — IBUPROFEN 400 MG: 400 TABLET, FILM COATED ORAL at 08:38

## 2022-02-04 RX ADMIN — Medication 3 MG: at 00:20

## 2022-02-04 RX ADMIN — Medication 3 MG: at 20:15

## 2022-02-04 RX ADMIN — TRAZODONE HYDROCHLORIDE 50 MG: 50 TABLET ORAL at 20:15

## 2022-02-04 RX ADMIN — IBUPROFEN 400 MG: 400 TABLET, FILM COATED ORAL at 20:16

## 2022-02-04 RX ADMIN — DULOXETINE 30 MG: 30 CAPSULE, DELAYED RELEASE ORAL at 08:17

## 2022-02-04 RX ADMIN — HYDROXYZINE HYDROCHLORIDE 25 MG: 25 TABLET ORAL at 20:17

## 2022-02-04 RX ADMIN — POLYETHYLENE GLYCOL 3350 17 G: 17 POWDER, FOR SOLUTION ORAL at 08:59

## 2022-02-04 ASSESSMENT — PAIN SCALES - GENERAL
PAINLEVEL_OUTOF10: 2
PAINLEVEL_OUTOF10: 4
PAINLEVEL_OUTOF10: 4

## 2022-02-04 ASSESSMENT — PAIN DESCRIPTION - LOCATION: LOCATION: BACK

## 2022-02-04 ASSESSMENT — PAIN DESCRIPTION - PROGRESSION: CLINICAL_PROGRESSION: NOT CHANGED

## 2022-02-04 ASSESSMENT — PAIN DESCRIPTION - FREQUENCY: FREQUENCY: INTERMITTENT

## 2022-02-04 ASSESSMENT — PAIN DESCRIPTION - PAIN TYPE: TYPE: CHRONIC PAIN

## 2022-02-04 ASSESSMENT — PAIN DESCRIPTION - ONSET: ONSET: ON-GOING

## 2022-02-04 ASSESSMENT — PAIN DESCRIPTION - ORIENTATION: ORIENTATION: UPPER

## 2022-02-04 ASSESSMENT — PAIN - FUNCTIONAL ASSESSMENT: PAIN_FUNCTIONAL_ASSESSMENT: PREVENTS OR INTERFERES SOME ACTIVE ACTIVITIES AND ADLS

## 2022-02-04 ASSESSMENT — PAIN DESCRIPTION - DESCRIPTORS: DESCRIPTORS: DISCOMFORT

## 2022-02-04 NOTE — PROGRESS NOTES
Department of Psychiatry  Attending Progress Note     Chief complaint: \"I'm ok\"    SUBJECTIVE:   Chart reviewed, discussed with the team. No major issues overnight. Patient is med-compliant. No SEs. Performs ADLs. Goes to groups. Patient seen in the dining area this am. She is withdrawn but cooperative. Affect is flat. Denies SI/HI/AVH. Rates depression 2/10, anxiety 2/10. Slept 7h. Appetite is fair. She does not offer much during the interview. Feels that she has overall improved. Plans to go to her mother upon discharge. Reports chronic back pain. Asking about Gabapentin stating she takes it for MS. Also states she takes Zyprexa in the morning and in the evening. States am dose helps with anxiety. OBJECTIVE    Physical  Wt Readings from Last 3 Encounters:   02/01/22 135 lb (61.2 kg)     Temp Readings from Last 3 Encounters:   02/04/22 98.8 °F (37.1 °C) (Temporal)     BP Readings from Last 3 Encounters:   02/04/22 (!) 109/58     Pulse Readings from Last 3 Encounters:   02/04/22 85        Review of Systems: 14-point review of systems negative except as described above    Mental Status Examination:   Appearance:  Stated age. In scrubs. Gait stable. No abnormal movements or tremor. Behavior: Withdrawn, cooperative  Speech: Hypoverbal  Mood: \"Better \"   Affect: Mood congruent. Thought Process: Appears linear. Thought Content: Denies SI/HI . No overt delusions or paranoia appreciated. Perceptions: Denies auditory or visual hallucinations at present time. Not responding to internal stimuli. Concentration: Intact. Orientation: to person, place, date, and situation. Language: Intact. Fund of information: Intact. Memory: Recent and remote appear intact. Neurovegitative: Fair appetite and sleep. Insight: Improving. Judgment: Improving.     Data  No results found for: WBC, HGB, HCT, MCV, PLT   No results found for: NA, K, CL, CO2, BUN, CREATININE, GLUCOSE, CALCIUM, PROT, LABALBU, BILITOT, ALKPHOS, AST, ALT, LABGLOM, GFRAA, AGRATIO, GLOB    Medications    Current Facility-Administered Medications:     melatonin tablet 3 mg, 3 mg, Oral, Nightly, Joanne Edmond MD, 3 mg at 02/04/22 0020    vitamin D (ERGOCALCIFEROL) capsule 50,000 Units, 50,000 Units, Oral, Weekly, Luis Mcpherson MD, 50,000 Units at 02/03/22 1024    therapeutic multivitamin-minerals 1 tablet, 1 tablet, Oral, Daily, Kate Shutters, APRN, 1 tablet at 02/04/22 2462    docusate sodium (COLACE) capsule 100 mg, 100 mg, Oral, Nightly, Kate Shutters, APRN, 100 mg at 02/03/22 2010    DULoxetine (CYMBALTA) extended release capsule 30 mg, 30 mg, Oral, Daily, Kate Shutters, APRN, 30 mg at 02/04/22 0817    OLANZapine (ZYPREXA) tablet 10 mg, 10 mg, Oral, Nightly, Kate Shutters, APRN, 10 mg at 02/03/22 2009    traZODone (DESYREL) tablet 50 mg, 50 mg, Oral, Nightly, Kate Shutters, APRN, 50 mg at 02/03/22 2010    ibuprofen (ADVIL;MOTRIN) tablet 400 mg, 400 mg, Oral, Q6H PRN, Kate Shutters, APRN, 400 mg at 02/04/22 5776    polyethylene glycol (GLYCOLAX) packet 17 g, 17 g, Oral, Daily PRN, Peter Amaya MD, 17 g at 02/04/22 0859    nicotine (NICODERM CQ) 21 MG/24HR 1 patch, 1 patch, TransDERmal, Daily, Peter Amaya MD, 1 patch at 02/04/22 0817    acetaminophen (TYLENOL) tablet 650 mg, 650 mg, Oral, Q4H PRN, Joanne Edmond MD, 650 mg at 02/02/22 8321    ASSESSMENT AND PLAN  DSM 5 DIAGNOSIS  Impression  Severe recurrent major depression with psychotic features   Cannabis use disorder, moderate, dependence  Vitamin D deficiency    Mild improvement. Continue to observe. Plan:   1. Psychiatric Medications: Add PRN Atarax for anxiety. Continue current psychotropic medications as recommended. Monitor for side effects. The risks, benefits, side effects, indications, contraindications, alternatives and adverse effects of the medications have been discussed with patient.     2. Continue to provide supportive psychotherapy. Encourage socialization and participation in recreational activities. Work on coping skills. 3. Medical Issues:    Continue medical monitoring by Dr. Brie Pierre and associates. 4. Disposition:     to provide outpatient resources and facilitate disposition.      Amount of time spent with patient:      35 minutes with greater than 50 % of the time spent in counseling and collaboration of care

## 2022-02-04 NOTE — PROGRESS NOTES
Annalise placed a call to get collateral and was asked to call back to speak with Kristen Peers at [phone]

## 2022-02-04 NOTE — PROGRESS NOTES
Progress Note  Contreras Lawler  2/4/2022 9:40 AM  Subjective:   Admit Date:   2/1/2022      CC/ADMIT DX:       Interval History:   Reviewed overnight events and nursing notes. She denies any new physical complaints. I have reviewed all labs/diagnostics from the last 24hrs. ROS:   I have done a 10 point ROS and all are negative, except what is mentioned in the HPI. ADULT DIET; Regular    Medications:      melatonin  3 mg Oral Nightly    vitamin D  50,000 Units Oral Weekly    therapeutic multivitamin-minerals  1 tablet Oral Daily    docusate sodium  100 mg Oral Nightly    DULoxetine  30 mg Oral Daily    OLANZapine  10 mg Oral Nightly    traZODone  50 mg Oral Nightly    nicotine  1 patch TransDERmal Daily           Objective:   Vitals: BP (!) 109/58   Pulse 85   Temp 98.8 °F (37.1 °C) (Temporal)   Resp 16   Wt 135 lb (61.2 kg)   SpO2 98%   Breastfeeding No  No intake or output data in the 24 hours ending 02/04/22 0940  General appearance: alert and cooperative with exam  Extremities: extremities normal, atraumatic, no cyanosis or edema  Neurologic:  No obvious focal neurologic deficits. Skin: no rashes    Assessment and Plan:   Principal Problem:    Severe recurrent major depression with psychotic features ([address]  Active Problems:    Cannabis use disorder, moderate, dependence ([facility])  Resolved Problems:    * No resolved hospital problems. *    Vit D Def    Plan:  1. Continue present medication(s)   2. She is medically stable. I will monitor for any changes or concerns. 3.  Follow with Psych      Discharge planning:   her home     Reviewed treatment plans with the patient and/or family.              Electronically signed by Lazaro Salmeron MD on 2/4/2022 at 9:40 AM

## 2022-02-04 NOTE — PROGRESS NOTES
[facility] Adult Unit Daily Assessment  Nursing Progress Note    Room: [facility]/606-01   Name: Eduardo Winter   Age: 29 y.o. Gender: female   Dx: Severe recurrent major depression with psychotic features ([address]  Precautions: suicide risk  Inpatient Status: voluntary       SLEEP:    Sleep Quality Good  Sleep Medications: Yes , trazodone 50 mg, melatonin 3 mg  PRN Sleep Meds: No       MEDICAL:    Other PRN Meds: No   Med Compliant: Yes  Accu-Chek: No  Oxygen/CPAP/BiPAP: No  CIWA/CINA: No   PAIN Assessment: none  Side Effects from medication: No    Is Patient experiencing any respiratory symptoms (headache, fever, body aches, cough. Clement Fly ): no  Patient educated by nursing to practice social distancing, wear masks, wash hands frequently: yes      PSYCH:    Depression: 0   Anxiety: 0   SI denies suicidal ideation   HI Negative for homicidal ideation      AVH:Absent      GENERAL:    Appetite: good    Social: Yes   Speech: normal   Appearance: appropriately dressed and healthy looking    GROUP:    Group Participation: Yes  Participation Quality: Interactive    Notes: Patient was in the dayroom watching television with her peers. Patient had a flat facial expression. Patient stated she was planning on living with her sister. Patient reported that she felt hopeful because of GOD. Continue to monitor for safety.          Electronically signed by Ayanna Cisneros RN on 2/4/22 at 4:35 AM CST

## 2022-02-04 NOTE — PROGRESS NOTES
Collateral obtained from: patients mom Phil Distance [phone]    Immediate Stressors & Time Episode Began: Patient has been homeless for some time and she has been staying with her mom off and on for sometime. Patient has been clashing with her mom. Patient has been [facility]. Patients mom is sick and is tired of allowing the patient staying with her. Patient used to live in housing and she lost custody of her two children. Patient seems to lose her clothes every time she leaves home    Diagnosis/Hx of compliance with meds: Previous diagnosis of Schizophrenia     Tx Hx/Past hospitalizations:  Several admissions    Family hx of psychiatric issues: Patient has a history of Bipolar disorder in her family    Substance Abuse: History of substance abuse    Pending Legal:     Safety Issues (Weapons? Hx of attempts): No issues    Support system/Medication Managed by: The importance of medication management and locking extra medication in a secured location was explained and reccommended to collateral.     Additional Info: Patient has a option to live with her mom but patient wants to go to [facility] after discharge.

## 2022-02-04 NOTE — PLAN OF CARE
Problem: Depressive Behavior With or Without Suicide Precautions:  Goal: Able to verbalize acceptance of life and situations over which he or she has no control  Description: Able to verbalize acceptance of life and situations over which he or she has no control  2/4/2022 1505 by Jose Luis Chacko RN  Outcome: Ongoing  2/4/2022 1303 by Thien Huitron LPC  Outcome: Ongoing  Goal: Able to verbalize and/or display a decrease in depressive symptoms  Description: Able to verbalize and/or display a decrease in depressive symptoms  Outcome: Ongoing  Goal: Ability to disclose and discuss suicidal ideas will improve  Description: Ability to disclose and discuss suicidal ideas will improve  Outcome: Ongoing  Goal: Able to verbalize support systems  Description: Able to verbalize support systems  Outcome: Ongoing  Goal: Absence of self-harm  Description: Absence of self-harm  Outcome: Ongoing  Goal: Patient specific goal  Description: Patient specific goal  Outcome: Ongoing  Goal: Participates in care planning  Description: Participates in care planning  Outcome: Ongoing     Problem: Pain:  Goal: Pain level will decrease  Description: Pain level will decrease  Outcome: Ongoing  Goal: Control of acute pain  Description: Control of acute pain  Outcome: Ongoing  Goal: Control of chronic pain  Description: Control of chronic pain  Outcome: Ongoing

## 2022-02-04 NOTE — PROGRESS NOTES
Group Note    Number of Participants in Group: 11  Number of Patients on Unit:15      Patient attended group:Yes  Reason for Absence:  Intervention for patient absence:        Type of Group:   Wrap-Up/Relaxation    Patient's Goal: See wrap up group sheet    Participation Level:     Active           Patient Response to Learning: Yes    Patient's Behavior: Cooperative    Is Patient Social/Interacting: Yes    Relaxation:   Television:Yes   Reading:No   Game/Puzzle:No   Phone: No       Notes/Comments:      Please see patient's wrap up group sheet for patient's comments       Electronically signed by Suman Vaz on 2/3/22 at 10:12 PM CST

## 2022-02-04 NOTE — PLAN OF CARE
Problem: Depressive Behavior With or Without Suicide Precautions:  Goal: Able to verbalize acceptance of life and situations over which he or she has no control  Description: Able to verbalize acceptance of life and situations over which he or she has no control  Outcome: Ongoing      Group Therapy Note     Date: 2/4/2022  Start Time: 1000  End Time:  1045  Number of Participants: 6     Type of Group: Psychotherapy     Patient's Goal: Patient will process emotions and actions and how to relate to other or their with others. Patient discussed open communication and feelings and emotions. Notes:  Patient attended process group as scheduled, patient identified a issue to work on today regarding how they will interact and relate to others. Status After Intervention:  Improved     Participation Level:  Active Listener     Participation Quality: Appropriate, Attentive, and Sharing        Speech:  normal        Thought Process/Content: Logical        Affective Functioning: Congruent        Mood: euthymic        Level of consciousness:  Alert        Response to Learning: Able to verbalize current knowledge/experience        Endings: None Reported     Modes of Intervention: Education, Support, and Socialization        Discipline Responsible: /Counselor        Signature:  Thiago Brizuela [facility]

## 2022-02-05 PROCEDURE — 99232 SBSQ HOSP IP/OBS MODERATE 35: CPT | Performed by: PSYCHIATRY & NEUROLOGY

## 2022-02-05 PROCEDURE — 1240000000 HC EMOTIONAL WELLNESS R&B

## 2022-02-05 PROCEDURE — 6370000000 HC RX 637 (ALT 250 FOR IP): Performed by: PSYCHIATRY & NEUROLOGY

## 2022-02-05 PROCEDURE — 6370000000 HC RX 637 (ALT 250 FOR IP): Performed by: NURSE PRACTITIONER

## 2022-02-05 RX ADMIN — Medication 1 TABLET: at 07:46

## 2022-02-05 RX ADMIN — TRAZODONE HYDROCHLORIDE 50 MG: 50 TABLET ORAL at 20:37

## 2022-02-05 RX ADMIN — POLYETHYLENE GLYCOL 3350 17 G: 17 POWDER, FOR SOLUTION ORAL at 11:04

## 2022-02-05 RX ADMIN — IBUPROFEN 400 MG: 400 TABLET, FILM COATED ORAL at 07:48

## 2022-02-05 RX ADMIN — DOCUSATE SODIUM 100 MG: 100 CAPSULE, LIQUID FILLED ORAL at 20:38

## 2022-02-05 RX ADMIN — ACETAMINOPHEN 650 MG: 325 TABLET ORAL at 12:31

## 2022-02-05 RX ADMIN — IBUPROFEN 400 MG: 400 TABLET, FILM COATED ORAL at 17:55

## 2022-02-05 RX ADMIN — Medication 3 MG: at 20:37

## 2022-02-05 RX ADMIN — ACETAMINOPHEN 650 MG: 325 TABLET ORAL at 20:37

## 2022-02-05 RX ADMIN — HYDROXYZINE HYDROCHLORIDE 25 MG: 25 TABLET ORAL at 14:55

## 2022-02-05 RX ADMIN — DULOXETINE 30 MG: 30 CAPSULE, DELAYED RELEASE ORAL at 07:46

## 2022-02-05 RX ADMIN — OLANZAPINE 10 MG: 10 TABLET, FILM COATED ORAL at 20:37

## 2022-02-05 RX ADMIN — HYDROXYZINE HYDROCHLORIDE 25 MG: 25 TABLET ORAL at 07:48

## 2022-02-05 ASSESSMENT — PAIN - FUNCTIONAL ASSESSMENT
PAIN_FUNCTIONAL_ASSESSMENT: ACTIVITIES ARE NOT PREVENTED

## 2022-02-05 ASSESSMENT — PAIN SCALES - GENERAL
PAINLEVEL_OUTOF10: 2
PAINLEVEL_OUTOF10: 2
PAINLEVEL_OUTOF10: 0
PAINLEVEL_OUTOF10: 4
PAINLEVEL_OUTOF10: 6

## 2022-02-05 ASSESSMENT — PAIN DESCRIPTION - FREQUENCY
FREQUENCY: INTERMITTENT
FREQUENCY: INTERMITTENT
FREQUENCY: CONTINUOUS

## 2022-02-05 ASSESSMENT — PAIN DESCRIPTION - LOCATION
LOCATION: HEAD
LOCATION: HEAD
LOCATION: BACK

## 2022-02-05 ASSESSMENT — PAIN DESCRIPTION - PAIN TYPE
TYPE: ACUTE PAIN
TYPE: ACUTE PAIN
TYPE: CHRONIC PAIN

## 2022-02-05 ASSESSMENT — PAIN DESCRIPTION - DESCRIPTORS
DESCRIPTORS: ACHING
DESCRIPTORS: HEADACHE
DESCRIPTORS: HEADACHE

## 2022-02-05 ASSESSMENT — PAIN DESCRIPTION - ONSET
ONSET: GRADUAL
ONSET: GRADUAL
ONSET: ON-GOING

## 2022-02-05 ASSESSMENT — PAIN DESCRIPTION - ORIENTATION: ORIENTATION: UPPER

## 2022-02-05 ASSESSMENT — PAIN DESCRIPTION - PROGRESSION: CLINICAL_PROGRESSION: GRADUALLY WORSENING

## 2022-02-05 NOTE — PROGRESS NOTES
Group Therapy Note    Start Time: 647  End Time:  815  Number of Participants: 9    Type of Group: Community Meeting       Patient's Goal:        Notes: No written goals by patient      Participation Level:  Active Listener       Participation Quality: Appropriate      Thought Process/Content: Logical      Affective Functioning: Congruent      Mood: Calm      Level of consciousness:  Alert      Modes of Intervention: Support      Discipline Responsible: Behavioral Health Tech II      Signature:  Laura Mars

## 2022-02-05 NOTE — PLAN OF CARE
Problem: Depressive Behavior With or Without Suicide Precautions:  Goal: Able to verbalize acceptance of life and situations over which he or she has no control  Description: Able to verbalize acceptance of life and situations over which he or she has no control  2/5/2022 0948 by Italo Thomas RN  Outcome: Ongoing  2/5/2022 0007 by Luke Arias RN  Outcome: Ongoing  Goal: Able to verbalize and/or display a decrease in depressive symptoms  Description: Able to verbalize and/or display a decrease in depressive symptoms  2/5/2022 0948 by Italo Thomas RN  Outcome: Ongoing  2/5/2022 0007 by Luke Arias RN  Outcome: Ongoing  Goal: Ability to disclose and discuss suicidal ideas will improve  Description: Ability to disclose and discuss suicidal ideas will improve  2/5/2022 0948 by Italo Thomas RN  Outcome: Ongoing  2/5/2022 0007 by Luke Arias RN  Outcome: Ongoing  Goal: Able to verbalize support systems  Description: Able to verbalize support systems  2/5/2022 0948 by Italo Thomas RN  Outcome: Ongoing  2/5/2022 0007 by Luke Arias RN  Outcome: Ongoing  Goal: Absence of self-harm  Description: Absence of self-harm  2/5/2022 0948 by Italo Thomas RN  Outcome: Ongoing  2/5/2022 0007 by Luke Arias RN  Outcome: Ongoing  Goal: Patient specific goal  Description: Patient specific goal  2/5/2022 0948 by Italo Thomas RN  Outcome: Ongoing  2/5/2022 0007 by Luke Arias RN  Outcome: Ongoing  Goal: Participates in care planning  Description: Participates in care planning  2/5/2022 0948 by Italo Thomas RN  Outcome: Ongoing  2/5/2022 0007 by Luke Arias RN  Outcome: Ongoing     Problem: Pain:  Goal: Pain level will decrease  Description: Pain level will decrease  2/5/2022 0948 by Italo Thomas RN  Outcome: Ongoing  2/5/2022 0007 by Luke Arias RN  Outcome: Ongoing  Goal: Control of acute pain  Description: Control of acute pain  2/5/2022 0948 by Corine Weinberg RN  Outcome: Ongoing  2/5/2022 0007 by Delmy Walker RN  Outcome: Ongoing  Goal: Control of chronic pain  Description: Control of chronic pain  2/5/2022 0948 by Corine Weinberg RN  Outcome: Ongoing  2/5/2022 0007 by Delmy Walker RN  Outcome: Ongoing

## 2022-02-05 NOTE — PROGRESS NOTES
Progress Note  Prasad Darling  2/5/2022 10:11 AM  Subjective:   Admit Date:   2/1/2022      CC/ADMIT DX:       Interval History:   Reviewed overnight events and nursing notes. She has no new complaints. I have reviewed all labs/diagnostics from the last 24hrs. ROS:   I have done a 10 point ROS and all are negative, except what is mentioned in the HPI. ADULT DIET; Regular    Medications:      melatonin  3 mg Oral Nightly    vitamin D  50,000 Units Oral Weekly    therapeutic multivitamin-minerals  1 tablet Oral Daily    docusate sodium  100 mg Oral Nightly    DULoxetine  30 mg Oral Daily    OLANZapine  10 mg Oral Nightly    traZODone  50 mg Oral Nightly    nicotine  1 patch TransDERmal Daily           Objective:   Vitals: /72   Pulse 91   Temp 97.9 °F (36.6 °C) (Temporal)   Resp 16   Wt 135 lb (61.2 kg)   SpO2 98%   Breastfeeding No  No intake or output data in the 24 hours ending 02/05/22 1011  General appearance: alert and cooperative with exam  Extremities: extremities normal, atraumatic, no cyanosis or edema  Neurologic:  No obvious focal neurologic deficits. Skin: no rashes    Assessment and Plan:   Principal Problem:    Severe recurrent major depression with psychotic features ([address]  Active Problems:    Cannabis use disorder, moderate, dependence ([facility])  Resolved Problems:    * No resolved hospital problems. *    Vit D Def    Plan:  1. Continue present medication(s)   2. She remains medically stable. I will monitor for any changes or concerns. 3.  Follow with Psych      Discharge planning:   her home     Reviewed treatment plans with the patient and/or family.              Electronically signed by Michele Lee MD on 2/5/2022 at 10:11 AM

## 2022-02-05 NOTE — PLAN OF CARE
Problem: Depressive Behavior With or Without Suicide Precautions:  Goal: Able to verbalize acceptance of life and situations over which he or she has no control  Description: Able to verbalize acceptance of life and situations over which he or she has no control  2/5/2022 0007 by Rob Duenas RN  Outcome: Ongoing     Problem: Depressive Behavior With or Without Suicide Precautions:  Goal: Able to verbalize and/or display a decrease in depressive symptoms  Description: Able to verbalize and/or display a decrease in depressive symptoms  2/5/2022 0007 by Rob Duenas RN  Outcome: Ongoing     Problem: Depressive Behavior With or Without Suicide Precautions:  Goal: Ability to disclose and discuss suicidal ideas will improve  Description: Ability to disclose and discuss suicidal ideas will improve  2/5/2022 0007 by Rob Duenas RN  Outcome: Ongoing     Problem: Depressive Behavior With or Without Suicide Precautions:  Goal: Able to verbalize support systems  Description: Able to verbalize support systems  2/5/2022 0007 by Rob Duenas RN  Outcome: Ongoing     Problem: Depressive Behavior With or Without Suicide Precautions:  Goal: Absence of self-harm  Description: Absence of self-harm  2/5/2022 0007 by Rob Duenas RN  Outcome: Ongoing     Problem: Depressive Behavior With or Without Suicide Precautions:  Goal: Patient specific goal  Description: Patient specific goal  2/5/2022 0007 by Rob Duenas RN  Outcome: Ongoing     Problem: Depressive Behavior With or Without Suicide Precautions:  Goal: Participates in care planning  Description: Participates in care planning  2/5/2022 0007 by Rob Duenas RN  Outcome: Ongoing     Problem: Pain:  Goal: Pain level will decrease  Description: Pain level will decrease  2/5/2022 0007 by Rob Duenas RN  Outcome: Ongoing     Problem: Pain:  Goal: Control of acute pain  Description: Control of acute pain  2/5/2022 0007 by Boy Dodd Liyah Sharif RN  Outcome: Ongoing     Problem: Pain:  Goal: Control of chronic pain  Description: Control of chronic pain  2/5/2022 0007 by Reagan Cameron RN  Outcome: Ongoing

## 2022-02-05 NOTE — PROGRESS NOTES
[facility] Adult Unit Daily Assessment  Nursing Progress Note    Room: [facility]/606-01   Name: Mohamud Thomas   Age: 29 y.o. Gender: female   Dx: Severe recurrent major depression with psychotic features ([address]  Precautions: suicide risk  Inpatient Status: voluntary       SLEEP:    Sleep Quality Good  Sleep Medications: Yes   PRN Sleep Meds: No       MEDICAL:    Other PRN Meds: Yes   Med Compliant: Yes  Accu-Chek: No  Oxygen/CPAP/BiPAP: No  CIWA/CINA: No   PAIN Assessment: receiving treatment  Side Effects from medication: No    Is Patient experiencing any respiratory symptoms (headache, fever, body aches, cough. Audie Quiroz ): no  Patient educated by nursing to practice social distancing, wear masks, wash hands frequently: yes      PSYCH:    Depression: 0   Anxiety: 0   SI denies suicidal ideation   HI Negative for homicidal ideation      AVH:Absent      GENERAL:    Appetite: good    Social: Yes   Speech: normal   Appearance: appropriately dressed and healthy looking    GROUP:    Group Participation: Yes  Participation Quality: Active Listener    Notes: Patient sitting in TV room. Patient is social and pleasant with staff and others. Medication compliant. Complaints of back pain 2 on a 0-10 scale. Will continue to assess pain, monitor and check for patient safety.           Electronically signed by Natalie Leija RN on 2/5/22 at 12:02 AM CST

## 2022-02-05 NOTE — PROGRESS NOTES
Department of Psychiatry  Attending Progress Note     Chief complaint: \"I'm doing better\"    SUBJECTIVE:   Chart reviewed, discussed with the team. No major issues overnight. Patient is med-compliant. No SEs. Performs ADLs. Goes to groups. Patient seen in the dining area this am. She is brighter. Smiled. Denies SI/HI/AVH. Rates depression 1/10, anxiety 2/10. Slept 7h. Appetite is fair. Feels that she has improved overall. States Atarax helps with anxiety. No physical complaints. OBJECTIVE    Physical  Wt Readings from Last 3 Encounters:   02/01/22 135 lb (61.2 kg)     Temp Readings from Last 3 Encounters:   02/05/22 97.9 °F (36.6 °C) (Temporal)     BP Readings from Last 3 Encounters:   02/05/22 118/72     Pulse Readings from Last 3 Encounters:   02/05/22 91        Review of Systems: 14-point review of systems negative except as described above    Mental Status Examination:   Appearance:  Stated age. In scrubs. Gait stable. No abnormal movements or tremor. Behavior: Calm, cooperative, smiled  Speech: Nonpressured  Mood: \"Better \"   Affect: Mood congruent. Thought Process: Appears linear. Thought Content: Denies SI/HI . No overt delusions or paranoia appreciated. Perceptions: Denies auditory or visual hallucinations at present time. Not responding to internal stimuli. Concentration: Intact. Orientation: to person, place, date, and situation. Language: Intact. Fund of information: Intact. Memory: Recent and remote appear intact. Neurovegitative: Fair appetite and sleep. Insight: Improving. Judgment: Improving.     Data  No results found for: WBC, HGB, HCT, MCV, PLT   No results found for: NA, K, CL, CO2, BUN, CREATININE, GLUCOSE, CALCIUM, PROT, LABALBU, BILITOT, ALKPHOS, AST, ALT, LABGLOM, GFRAA, AGRATIO, GLOB    Medications    Current Facility-Administered Medications:     melatonin tablet 3 mg, 3 mg, Oral, Nightly, Bryan Hughes MD, 3 mg at 02/04/22 2015    hydrOXYzine (ATARAX) tablet 25 mg, 25 mg, Oral, TID PRN, Marciano Mccarthy MD, 25 mg at 02/05/22 0748    vitamin D (ERGOCALCIFEROL) capsule 50,000 Units, 50,000 Units, Oral, Weekly, Bronson Ellison MD, 50,000 Units at 02/03/22 1024    therapeutic multivitamin-minerals 1 tablet, 1 tablet, Oral, Daily, Carolee Hammers, APRN, 1 tablet at 02/05/22 0746    docusate sodium (COLACE) capsule 100 mg, 100 mg, Oral, Nightly, Tg Hammers, APRN, 100 mg at 02/04/22 2015    DULoxetine (CYMBALTA) extended release capsule 30 mg, 30 mg, Oral, Daily, Tg Hammers, APRN, 30 mg at 02/05/22 0746    OLANZapine (ZYPREXA) tablet 10 mg, 10 mg, Oral, Nightly, Tg Hammers, APRN, 10 mg at 02/04/22 2016    traZODone (DESYREL) tablet 50 mg, 50 mg, Oral, Nightly, Tg Hammers, APRN, 50 mg at 02/04/22 2015    ibuprofen (ADVIL;MOTRIN) tablet 400 mg, 400 mg, Oral, Q6H PRN, Tg Kyleers, APRN, 400 mg at 02/05/22 0748    polyethylene glycol (GLYCOLAX) packet 17 g, 17 g, Oral, Daily PRN, Marciano Mccarthy MD, 17 g at 02/05/22 1104    nicotine (NICODERM CQ) 21 MG/24HR 1 patch, 1 patch, TransDERmal, Daily, Marciano Mccarthy MD, 1 patch at 02/04/22 0817    acetaminophen (TYLENOL) tablet 650 mg, 650 mg, Oral, Q4H PRN, Heidi Claire MD, 650 mg at 02/02/22 5786    ASSESSMENT AND PLAN  DSM 5 DIAGNOSIS  Impression  Severe recurrent major depression with psychotic features   Cannabis use disorder, moderate, dependence  Vitamin D deficiency    Continues to improve. Plan to DC Monday. Plan:   1. Psychiatric Medications:   Continue current psychotropic medications as recommended. Monitor for side effects. The risks, benefits, side effects, indications, contraindications, alternatives and adverse effects of the medications have been discussed with patient. 2. Continue to provide supportive psychotherapy. Encourage socialization and participation in recreational activities. Work on coping skills.     3. Medical Issues:    Continue medical monitoring by Dr. Estrella Dias and associates. 4. Disposition:     to provide outpatient resources and facilitate disposition.      Amount of time spent with patient:      25 minutes with greater than 50 % of the time spent in counseling and collaboration of care

## 2022-02-05 NOTE — PROGRESS NOTES
[facility] Adult Unit Daily Assessment  Nursing Progress Note    Room: [facility]/606-01   Name: Prasad Darling   Age: 29 y.o. Gender: female   Dx: Severe recurrent major depression with psychotic features ([address]  Precautions: suicide risk  Inpatient Status: voluntary       SLEEP:    Sleep Quality Fair  Sleep Medications: Yes   PRN Sleep Meds: No       MEDICAL:    Other PRN Meds: Yes   Med Compliant: Yes  Accu-Chek: No  Oxygen/CPAP/BiPAP: No  CIWA/CINA: No   PAIN Assessment: level of pain (1-10, 10 severe), 2 upper back pain  Side Effects from medication: No    Is Patient experiencing any respiratory symptoms (headache, fever, body aches, cough. Nitesh Askew ): no  Patient educated by nursing to practice social distancing, wear masks, wash hands frequently: yes      PSYCH:    Depression: 0   Anxiety: 0   SI denies suicidal ideation   HI Negative for homicidal ideation      AVH:Absent      GENERAL:    Appetite: good    Social: Yes   Speech: normal   Appearance: appropriately dressed    GROUP:    Group Participation: Yes  Participation Quality: Active Listener and Interactive    Notes: Pt is alert and oriented, cooperative. Flat affect, mood congruent. Pt is able to focus during conversation. Reports fair sleep and good appetite. Memory intact. Limited insight and judgment. Denies depression and anxiety, Denies SI, HI, and AVH. Will continue to monitor.          Electronically signed by Reyna Meyers RN on 2/5/22 at 9:54 AM CST

## 2022-02-06 PROCEDURE — 1240000000 HC EMOTIONAL WELLNESS R&B

## 2022-02-06 PROCEDURE — 6370000000 HC RX 637 (ALT 250 FOR IP): Performed by: PSYCHIATRY & NEUROLOGY

## 2022-02-06 PROCEDURE — 6370000000 HC RX 637 (ALT 250 FOR IP): Performed by: NURSE PRACTITIONER

## 2022-02-06 RX ADMIN — HYDROXYZINE HYDROCHLORIDE 25 MG: 25 TABLET ORAL at 21:20

## 2022-02-06 RX ADMIN — TRAZODONE HYDROCHLORIDE 50 MG: 50 TABLET ORAL at 20:46

## 2022-02-06 RX ADMIN — Medication 3 MG: at 20:46

## 2022-02-06 RX ADMIN — HYDROXYZINE HYDROCHLORIDE 25 MG: 25 TABLET ORAL at 03:15

## 2022-02-06 RX ADMIN — DULOXETINE 30 MG: 30 CAPSULE, DELAYED RELEASE ORAL at 08:10

## 2022-02-06 RX ADMIN — HYDROXYZINE HYDROCHLORIDE 25 MG: 25 TABLET ORAL at 08:17

## 2022-02-06 RX ADMIN — DOCUSATE SODIUM 100 MG: 100 CAPSULE, LIQUID FILLED ORAL at 20:46

## 2022-02-06 RX ADMIN — Medication 1 TABLET: at 08:10

## 2022-02-06 RX ADMIN — IBUPROFEN 400 MG: 400 TABLET, FILM COATED ORAL at 14:00

## 2022-02-06 RX ADMIN — OLANZAPINE 10 MG: 10 TABLET, FILM COATED ORAL at 20:46

## 2022-02-06 RX ADMIN — IBUPROFEN 400 MG: 400 TABLET, FILM COATED ORAL at 08:16

## 2022-02-06 RX ADMIN — POLYETHYLENE GLYCOL 3350 17 G: 17 POWDER, FOR SOLUTION ORAL at 10:03

## 2022-02-06 RX ADMIN — ACETAMINOPHEN 650 MG: 325 TABLET ORAL at 20:01

## 2022-02-06 ASSESSMENT — PAIN DESCRIPTION - DESCRIPTORS
DESCRIPTORS: ACHING;CONSTANT
DESCRIPTORS: ACHING;DISCOMFORT

## 2022-02-06 ASSESSMENT — PAIN SCALES - GENERAL
PAINLEVEL_OUTOF10: 2
PAINLEVEL_OUTOF10: 0

## 2022-02-06 ASSESSMENT — PAIN DESCRIPTION - PAIN TYPE
TYPE: CHRONIC PAIN
TYPE: CHRONIC PAIN

## 2022-02-06 ASSESSMENT — PAIN DESCRIPTION - ONSET
ONSET: GRADUAL
ONSET: ON-GOING

## 2022-02-06 ASSESSMENT — PAIN - FUNCTIONAL ASSESSMENT
PAIN_FUNCTIONAL_ASSESSMENT: ACTIVITIES ARE NOT PREVENTED
PAIN_FUNCTIONAL_ASSESSMENT: ACTIVITIES ARE NOT PREVENTED

## 2022-02-06 ASSESSMENT — PAIN DESCRIPTION - LOCATION
LOCATION: BACK
LOCATION: BACK

## 2022-02-06 ASSESSMENT — PAIN DESCRIPTION - PROGRESSION: CLINICAL_PROGRESSION: GRADUALLY WORSENING

## 2022-02-06 ASSESSMENT — PAIN DESCRIPTION - FREQUENCY
FREQUENCY: INTERMITTENT
FREQUENCY: CONTINUOUS

## 2022-02-06 ASSESSMENT — PAIN DESCRIPTION - ORIENTATION
ORIENTATION: UPPER
ORIENTATION: UPPER

## 2022-02-06 NOTE — PLAN OF CARE
Problem: Depressive Behavior With or Without Suicide Precautions:  Goal: Able to verbalize acceptance of life and situations over which he or she has no control  Description: Able to verbalize acceptance of life and situations over which he or she has no control  2/6/2022 0933 by Zaida Foster RN  Outcome: Ongoing  2/6/2022 0401 by Saul Davis RN  Outcome: Ongoing  Goal: Able to verbalize and/or display a decrease in depressive symptoms  Description: Able to verbalize and/or display a decrease in depressive symptoms  2/6/2022 0933 by Zaida Foster RN  Outcome: Ongoing  2/6/2022 0401 by Saul Davis RN  Outcome: Met This Shift  Goal: Ability to disclose and discuss suicidal ideas will improve  Description: Ability to disclose and discuss suicidal ideas will improve  2/6/2022 0933 by Zaida Foster RN  Outcome: Ongoing  2/6/2022 0401 by Saul Davis RN  Outcome: Ongoing  Goal: Able to verbalize support systems  Description: Able to verbalize support systems  2/6/2022 0933 by Zaida Foster RN  Outcome: Ongoing  2/6/2022 0401 by Saul Davis RN  Outcome: Ongoing  Goal: Absence of self-harm  Description: Absence of self-harm  2/6/2022 0933 by Zaida Foster RN  Outcome: Ongoing  2/6/2022 0401 by Saul Davis RN  Outcome: Met This Shift  Goal: Patient specific goal  Description: Patient specific goal  2/6/2022 0933 by Zaida Foster RN  Outcome: Ongoing  2/6/2022 0401 by Saul Davis RN  Outcome: Ongoing  Goal: Participates in care planning  Description: Participates in care planning  2/6/2022 0933 by Zaida Foster RN  Outcome: Ongoing  2/6/2022 0401 by Saul Davis RN  Outcome: Ongoing     Problem: Pain:  Goal: Pain level will decrease  Description: Pain level will decrease  2/6/2022 0933 by Zaida Foster RN  Outcome: Ongoing  2/6/2022 0401 by Saul Davis RN  Outcome: Ongoing  Goal: Control of acute pain  Description: Control of acute pain  2/6/2022 3041 by Italo Thomas RN  Outcome: Ongoing  2/6/2022 0401 by Marychuy Curtis RN  Outcome: Ongoing  Goal: Control of chronic pain  Description: Control of chronic pain  2/6/2022 0933 by Italo Thomas RN  Outcome: Ongoing  2/6/2022 0401 by Marychuy Curtis RN  Outcome: Ongoing

## 2022-02-06 NOTE — PLAN OF CARE
Problem: Depressive Behavior With or Without Suicide Precautions:  Goal: Able to verbalize acceptance of life and situations over which he or she has no control  Description: Able to verbalize acceptance of life and situations over which he or she has no control  Outcome: Ongoing  Goal: Able to verbalize and/or display a decrease in depressive symptoms  Description: Able to verbalize and/or display a decrease in depressive symptoms  Outcome: Met This Shift  Goal: Ability to disclose and discuss suicidal ideas will improve  Description: Ability to disclose and discuss suicidal ideas will improve  Outcome: Ongoing  Goal: Able to verbalize support systems  Description: Able to verbalize support systems  Outcome: Ongoing  Goal: Absence of self-harm  Description: Absence of self-harm  Outcome: Met This Shift  Goal: Patient specific goal  Description: Patient specific goal  Outcome: Ongoing  Goal: Participates in care planning  Description: Participates in care planning  Outcome: Ongoing     Problem: Pain:  Goal: Pain level will decrease  Description: Pain level will decrease  Outcome: Ongoing  Goal: Control of acute pain  Description: Control of acute pain  Outcome: Ongoing  Goal: Control of chronic pain  Description: Control of chronic pain  Outcome: Ongoing

## 2022-02-06 NOTE — PROGRESS NOTES
[facility] Adult Unit Daily Assessment  Nursing Progress Note    Room: 0602/602-01   Name: Chel Christian   Age: 29 y.o. Gender: female   Dx: Severe recurrent major depression with psychotic features ([address]  Precautions: suicide risk  Inpatient Status: voluntary       SLEEP:    Sleep Quality Good  Sleep Medications: Yes   PRN Sleep Meds: No       MEDICAL:    Other PRN Meds: Yes   Med Compliant: Yes  Accu-Chek: No  Oxygen/CPAP/BiPAP: No  CIWA/CINA: No   PAIN Assessment: level of pain (1-10, 10 severe), 2 (chronic upper back pain)  Side Effects from medication: No    Is Patient experiencing any respiratory symptoms (headache, fever, body aches, cough. Meghan Brittle ): no  Patient educated by nursing to practice social distancing, wear masks, wash hands frequently: yes      PSYCH:    Depression: 0   Anxiety: 2   SI denies suicidal ideation   HI Negative for homicidal ideation      AVH:Absent      GENERAL:    Appetite: good    Social: Yes   Speech: normal   Appearance: appropriately dressed, appropriately groomed and healthy looking    GROUP:    Group Participation: Yes  Participation Quality: Active Listener and Interactive    Notes: Pt is alert and oriented, cooperative. Brightened affect, mood congruent. Pt is able to focus during interview. Linear thought processes. Memory intact. Limited insight and judgment. Reports good sleep and appetite. Social with staff and peers. Denies depression, rates anxiety 2. Denies SI, HI, and AVH. Will continue to monitor.     Electronically signed by Stephani Rodrigues RN on 2/6/22 at 9:41 AM CST

## 2022-02-06 NOTE — PROGRESS NOTES
Progress Note  Maximino Mccormack  2/6/2022 1:35 PM  Subjective:   Admit Date:   2/1/2022      CC/ADMIT DX:       Interval History:   Reviewed overnight events and nursing notes. She has no medical complaints. I have reviewed all labs/diagnostics from the last 24hrs. ROS:   I have done a 10 point ROS and all are negative, except what is mentioned in the HPI. ADULT DIET; Regular    Medications:      melatonin  3 mg Oral Nightly    vitamin D  50,000 Units Oral Weekly    therapeutic multivitamin-minerals  1 tablet Oral Daily    docusate sodium  100 mg Oral Nightly    DULoxetine  30 mg Oral Daily    OLANZapine  10 mg Oral Nightly    traZODone  50 mg Oral Nightly    nicotine  1 patch TransDERmal Daily           Objective:   Vitals: BP 99/62   Pulse 72   Temp 97.7 °F (36.5 °C) (Tympanic)   Resp 16   Wt 135 lb (61.2 kg)   SpO2 96%   Breastfeeding No  No intake or output data in the 24 hours ending 02/06/22 1335  General appearance: alert and cooperative with exam  Extremities: extremities normal, atraumatic, no cyanosis or edema  Neurologic:  No obvious focal neurologic deficits. Skin: no rashes    Assessment and Plan:   Principal Problem:    Severe recurrent major depression with psychotic features ([address]  Active Problems:    Cannabis use disorder, moderate, dependence ([facility])  Resolved Problems:    * No resolved hospital problems. *    Vit D Def    Plan:  1. Continue present medication(s)   2. She remians medically stable. I will monitor for any changes or concerns. 3.  Follow with Psych      Discharge planning:   her home     Reviewed treatment plans with the patient and/or family.              Electronically signed by Desiree Peña MD on 2/6/2022 at 1:35 PM

## 2022-02-06 NOTE — BH NOTE
[facility] Adult Unit Daily Assessment  Nursing Progress Note    Room: [facility]/606-01   Name: Mohamud Thomas   Age: 29 y.o. Gender: female   Dx: Severe recurrent major depression with psychotic features ([address]  Precautions: suicide risk  Inpatient Status: voluntary       SLEEP:    Sleep Quality Fair  Sleep Medications: Yes and Zyprexa, Melatonin, trazodone   PRN Sleep Meds: No       MEDICAL:    Other PRN Meds: Yes and Tylenol, and Atarax   Med Compliant: Yes  Accu-Chek: No  Oxygen/CPAP/BiPAP: No  CIWA/CINA: No   PAIN Assessment: headaches, receiving treatment or level of pain (1-10, 10 severe), 2  Side Effects from medication: No    Is Patient experiencing any respiratory symptoms (headache, fever, body aches, cough. Audie Quiroz ): no respiratory symptoms, headache rated 2 for pain  Patient educated by nursing to practice social distancing, wear masks, wash hands frequently: yes      PSYCH:    Depression: 0   Anxiety: 3   SI denies suicidal ideation   HI Negative for homicidal ideation      AVH:Absent      GENERAL:    Appetite: no change from normal    Social: Yes and minimL   Speech: normal and hesitant   Appearance: appropriately dressed and healthy looking    GROUP:    Group Participation: Yes  Participation Quality: Active Listener and Minimal    Notes:   Pt in day area, dressed casual attire, pt affect flat, behavior is calm and cooperative, pt denies depression and rates anxiety 3 on scale of one to ten and ten being the highest. Pt is isolative to self interm, social interm this shift and states that headache rates a 2 in pain and she received Tylenol for pain. Pt is med compliant, performs ADL's and appetite is normal for patient. Pt awoken at 0315 and requested her Atarax for anxiety upon awakening. Pt was admin atarax 25 mg. Pt denies SI,HI and AVH this shift. Will continue to monitor for safety and comfort.      Electronically signed by Saul Davis RN on 2/6/22 at 4:05 AM CST

## 2022-02-06 NOTE — PROGRESS NOTES
Group Therapy Note     Date: 02/06/22  Start AMLJ: [id number]  End [id number]     Number of Participants: 16     Type of Group: self inventory     Patient's Goal:  none     Notes:  Patient completed self inventory and filled out menu     Status After Intervention:  Unchanged     Participation Level: Minimal     Participation Quality: Appropriate     Speech:  normal     Thought Process/Content: Logical  Linear     Affective Functioning: Congruent     Mood: calm     Level of consciousness:  Alert and Oriented x4     Response to Learning: Able to verbalize current knowledge/experience     Modes of Intervention: Education and Support     Discipline Responsible: Registered Nurse     Lamar Mcnulty RN

## 2022-02-06 NOTE — BH NOTE
WRAP UP GROUP NOTE    Patient's Goal:  Providing feedback as to their own progress in the care-plan provided. Patients have an opportunity to explore self-reflective skills and share any additional cares and concerns not yet addressed. Pt effectively participated.     Energy Level:normal  Appetite:normal  Concentration:normal  Hallucinations:gone  Depression:same  Anxiety:gone  How I worked today:tried a little  What helps me sleep:blank  Any questions/complaints/comments:  \"no\"    Group/activities that helped me today were:  Nursing Education    Electronically signed by Daniele Mendes RN on 2/5/2022 at 10:02 PM

## 2022-02-07 VITALS
RESPIRATION RATE: 18 BRPM | WEIGHT: 135 LBS | DIASTOLIC BLOOD PRESSURE: 67 MMHG | OXYGEN SATURATION: 97 % | TEMPERATURE: 97.7 F | HEART RATE: 77 BPM | SYSTOLIC BLOOD PRESSURE: 105 MMHG

## 2022-02-07 PROCEDURE — 99238 HOSP IP/OBS DSCHRG MGMT 30/<: CPT | Performed by: NURSE PRACTITIONER

## 2022-02-07 PROCEDURE — 6370000000 HC RX 637 (ALT 250 FOR IP): Performed by: PSYCHIATRY & NEUROLOGY

## 2022-02-07 PROCEDURE — 5130000000 HC BRIDGE APPOINTMENT

## 2022-02-07 PROCEDURE — 6370000000 HC RX 637 (ALT 250 FOR IP): Performed by: NURSE PRACTITIONER

## 2022-02-07 RX ORDER — HYDROXYZINE HYDROCHLORIDE 25 MG/1
25 TABLET, FILM COATED ORAL 3 TIMES DAILY PRN
Qty: 42 TABLET | Refills: 0 | Status: SHIPPED | OUTPATIENT
Start: 2022-02-07 | End: 2022-02-21

## 2022-02-07 RX ORDER — OLANZAPINE 10 MG/1
10 TABLET ORAL NIGHTLY
Qty: 30 TABLET | Refills: 0 | Status: SHIPPED | OUTPATIENT
Start: 2022-02-07 | End: 2022-02-21

## 2022-02-07 RX ORDER — DULOXETIN HYDROCHLORIDE 30 MG/1
30 CAPSULE, DELAYED RELEASE ORAL DAILY
Qty: 14 CAPSULE | Refills: 0 | Status: SHIPPED | OUTPATIENT
Start: 2022-02-08 | End: 2022-02-22

## 2022-02-07 RX ORDER — ERGOCALCIFEROL 1.25 MG/1
50000 CAPSULE ORAL WEEKLY
Qty: 5 CAPSULE | Refills: 0 | Status: SHIPPED | OUTPATIENT
Start: 2022-02-10 | End: 2022-04-22

## 2022-02-07 RX ORDER — TRAZODONE HYDROCHLORIDE 50 MG/1
50 TABLET ORAL NIGHTLY
Qty: 14 TABLET | Refills: 0 | Status: SHIPPED | OUTPATIENT
Start: 2022-02-07 | End: 2022-02-21

## 2022-02-07 RX ADMIN — IBUPROFEN 400 MG: 400 TABLET, FILM COATED ORAL at 08:44

## 2022-02-07 RX ADMIN — DULOXETINE 30 MG: 30 CAPSULE, DELAYED RELEASE ORAL at 08:34

## 2022-02-07 RX ADMIN — Medication 1 TABLET: at 08:34

## 2022-02-07 RX ADMIN — POLYETHYLENE GLYCOL 3350 17 G: 17 POWDER, FOR SOLUTION ORAL at 10:26

## 2022-02-07 ASSESSMENT — PAIN SCALES - GENERAL: PAINLEVEL_OUTOF10: 2

## 2022-02-07 NOTE — PROGRESS NOTES
Discharge Note     Pt discharging on this date. Pt denies SI, HI, and AVH at this time. Pt reports improvement in behavior and is leaving unit in overall good condition. SW and pt discussed pt's follow up appointments and importance of attending appointments as scheduled, pt voiced understanding and agreement. Pt and SW also discussed pt safety plan and pt able to verbally identify: warning signs, coping strategies, places and people that help make the pt feel better/distract negative thoughts, friends/family/agencies/professionals the pt can reach out to in a crisis, and something that is important to the pt/worth living for. Pt provided the national suicide prevention hotline number (7-570-447-070-556-2298) as well as local community behavioral health [facility]) crisis number for emergencies (5-113.763.3269). Pt to follow up with:  Floyd Regalado for medication management, patient will be seen on with in a week of dischargefor the med management appt. Referral to out patient tobacco cessation counseling treatment:    Patient refused referral to outpatient tobacco cessation counseling    HALIE offered to assist pt with transportation, pt reports that she will need a cab home.  HALIE arranged for a cab to transport patient home

## 2022-02-07 NOTE — DISCHARGE SUMMARY
history of daquan or hypomania. Hospital Course:   Patient was admitted to the adult behavioral health floor and was acclimated to the floor. Labs were reviewed and physical exam was completed by Dr. Alonso Ocasio and associates. Home medications were reconciled. ETIENNE was obtained and reviewed. Collateral was obtained from her mother whom she will living with after discharge. Patients home medications were continued. Hydroxyzine was initiated for anxiety and pateint tolerated the medication well. Her vitamin d was replaced due to vitamin d deficiency. She was behaviorally stable during the entire psychiatric hospitalization. Patient attended and participated in groups.  Patient was calm and cooperative with staff and peers. Patient was compliant with her medications. Patient was sleeping through the night. This patient is not suicidal, homicidal or psychotic at discharge. She does not present a danger to self or others. On the day of discharge and transfer of care, patient indicated readiness for discharge. She was not acutely manic nor agitated with no reported symptoms of psychosis. She indicated no thoughts of self-harm or harm to others. Given resolution of presenting symptoms and patient readiness for discharge and that patient agreed to follow-up with outpatient services with [facility] and the patient was discharged with a 30 day supply of medication. She denied access to firearms or weapons. She was advised to abstain from all drugs and alcohol and to remain adherent to medication as prescribed.       Number of antipsychotic medication prescribed at discharge: 1- Olanzapine      Referral to addiction treatment: pt declined    Prescription for Alcohol or Drug Disorder Medication: no fda approved medications for methamphetamine     Prescription for Tobacco Cessation medication: pt declined    If no prescriptions for Tobacco Cessation must document why: pt declined    Consults: internal medicine    Significant Diagnostic Studies: labs: PT WAS A DIRECT ADMIT    Results for Zafar Tracey (MRN [id number]) as of 2/7/2022 10:10   Ref. Range 2/3/2022 06:25 2/3/2022 06:34   TSH Reflex FT4 Latest Ref Range: 0.35 - 5.50 uIU/mL  2.32   Vit D, 25-Hydroxy Latest Ref Range: >=30 ng/mL  20.7 (L)   Vitamin B-12 Latest Ref Range: 211 - 946 pg/mL  709   HCG(Urine) Pregnancy Test Unknown Negative        Treatments: therapies: RN and SW    Alert, Oriented X 4  Appearance:  Grooming and Hygiene attended to  Speech with Regular Rate and Rhythm  Eye Contact:  Good  No Psychomotor Agitation/Retardation Noted  Attitude:  Cooperative  Mood:  \"I am feeling so much better. \"  Affective: Congruent, appropriate to the situation, with a normal range and intensity  Thought Processes:  Coherently communicated, logical and goal oriented  Thought Content:  At this time No Suicidal Ideation, No Homicidal Ideation, No Auditory or Visual   Hallucinations, No overt Delusions  Insight:  Present  Judgement:  Normal  Memory is intact for both remote and recent  Intellectual Functioning:  Within the [address] of Knowledge:  Adequate  Attention and Concentration:  Adequate        Discharge Exam:  GAIT STABLE  SPEAKS IN FULL SENTENCES WITHOUT SHORTNESS OF AIR    Disposition: home with her mother    Patient Instructions:   Current Discharge Medication List      START taking these medications    Details   hydrOXYzine (ATARAX) 25 MG tablet Take 1 tablet by mouth 3 times daily as needed for Anxiety  Qty: 42 tablet, Refills: 0      vitamin D (ERGOCALCIFEROL) 1.25 MG (23488 UT) CAPS capsule Take 1 capsule by mouth once a week for 11 doses  Qty: 5 capsule, Refills: 0         CONTINUE these medications which have CHANGED    Details   DULoxetine (CYMBALTA) 30 MG extended release capsule Take 1 capsule by mouth daily for 14 days  Qty: 14 capsule, Refills: 0      OLANZapine (ZYPREXA) 10 MG tablet Take 1 tablet by mouth nightly for 14 days  Qty: 30 tablet, Refills: 0 traZODone (DESYREL) 50 MG tablet Take 1 tablet by mouth nightly for 14 days  Qty: 14 tablet, Refills: 0         CONTINUE these medications which have NOT CHANGED    Details   gabapentin (NEURONTIN) 600 MG tablet Take 600 mg by mouth 3 times daily. docusate sodium (COLACE) 100 MG capsule Take 100 mg by mouth nightly      Multiple Vitamins-Minerals (THERAPEUTIC MULTIVITAMIN-MINERALS) tablet Take 1 tablet by mouth daily      metroNIDAZOLE (FLAGYL) 500 MG tablet Take 500 mg by mouth 2 times daily           Activity: activity as tolerated  Diet: regular diet  Wound Care: none needed    Follow-up with   PCP in 2 weeks.     Floyd in one week     Time worked: Less than 30 minutes    Participation:good    Electronically signed by ALAN Thibodeaux on 2/7/2022 at 9:55 AM

## 2022-02-07 NOTE — BH NOTE
[facility]  Discharge Note  Bridge Appointment completed: Reviewed Discharge Instructions with patient. Patient verbalizes understanding and agreement with the discharge plan using the teachback method. Referral for Outpatient Tobacco Cessation Counseling, upon discharge (isabel X if applicable and completed):    ( )  Hospital staff assisted patient to call Quit Line or faxed referral                                   during hospitalization                  (x )  Recognizing danger situations (included triggers and roadblocks), if not completed on admission                    (x )  Coping skills (new ways to manage stress, exercise, relaxation techniques, changing routine, distraction), if not completed on admission                                                           (x )  Basic information about quitting (benefits of quitting, techniques in how to quit, available resources, if not completed on admission  ( ) Referral for counseling faxed to [facility]   (x ) Patient refused referral  (x ) Patient refused counseling  (x ) Patient refused smoking cessation medication upon discharge    Vaccinations (isabel X if applicable and completed): ( x) Patient states already received influenza vaccine elsewhere  ( ) Patient received influenza vaccine during this hospitalization  ( ) Patient refused influenza vaccine at this time      Pt discharged with followings belongings:       Valuables sent home with all belongings returned to patient. . Valuables retrieved from [employer] and returned to patient. Patient left department with   via  , discharged to  . Patient education on aftercare instructions: explored and reviewed. Patient verbalize understanding of AVS:  Yes. .  Suicidal Ideations? No AVH? Denies. HI?  Negative for homicidal ideation       Status EXAM upon discharge:  Status and Exam  Normal: Yes  Facial Expression: Brightened  Affect: Congruent  Level of Consciousness: Alert  Mood:Normal: Yes  Mood: Anxious  Motor Activity:Normal: Yes  Motor Activity: Increased  Interview Behavior: Cooperative  Preception: [place] to Person,[place] to Time,[place] to Place,[place] to Situation  Attention:Normal: Yes  Attention:  (adequate concentration)  Thought Processes:  (linear)  Thought Content:Normal: Yes  Thought Content:  (denies suicidal ideation)  Hallucinations: None  Delusions: No  Memory:Normal: Yes  Insight and Judgment: Yes  Insight and Judgment:  (improved insight and judgment)  Present Suicidal Ideation: No  Present Homicidal Ideation: No

## 2022-02-07 NOTE — PROGRESS NOTES
Progress Note  Marlen Guido  2/7/2022 5:48 PM  Subjective:   Admit Date:   2/1/2022      CC/ADMIT DX:       Interval History:   Reviewed overnight events and nursing notes. She denies any physical complaints. I have reviewed all labs/diagnostics from the last 24hrs. ROS:   I have done a 10 point ROS and all are negative, except what is mentioned in the HPI. No diet orders on file    Medications:             Objective:   Vitals: /67   Pulse 77   Temp 97.7 °F (36.5 °C) (Temporal)   Resp 18   Wt 135 lb (61.2 kg)   SpO2 97%   Breastfeeding No  No intake or output data in the 24 hours ending 02/07/22 1748  General appearance: alert and cooperative with exam  Extremities: extremities normal, atraumatic, no cyanosis or edema  Neurologic:  No obvious focal neurologic deficits. Skin: no rashes    Assessment and Plan:   Principal Problem:    Severe recurrent major depression with psychotic features ([address]  Active Problems:    Cannabis use disorder, moderate, dependence (HCC)  Resolved Problems:    * No resolved hospital problems. *    Vit D Def    Plan:  1. Continue present medication(s)   2. She is medically stable. I will monitor for any changes or concerns. 3.  Follow with Psych      Discharge planning:   her home     Reviewed treatment plans with the patient and/or family.              Electronically signed by Varinder Otoole MD on 2/7/2022 at 5:48 PM

## 2022-02-07 NOTE — PROGRESS NOTES
[facility]  Discharge Note  Bridge Appointment completed: Reviewed Discharge Instructions with patient. Patient verbalizes understanding and agreement with the discharge plan using the teachback method. Referral for Outpatient Tobacco Cessation Counseling, upon discharge (isabel X if applicable and completed):    ( )  Hospital staff assisted patient to call Quit Line or faxed referral                                   during hospitalization                  ( )  Recognizing danger situations (included triggers and roadblocks), if not completed on admission                    ( )  Coping skills (new ways to manage stress, exercise, relaxation techniques, changing routine, distraction), if not completed on admission                                                           ( )  Basic information about quitting (benefits of quitting, techniques in how to quit, available resources, if not completed on admission  ( ) Referral for counseling faxed to [facility]   ( ) Patient refused referral  ( ) Patient refused counseling  (x ) Patient refused smoking cessation medication upon discharge    Vaccinations (isabel X if applicable and completed):  ( ) Patient states already received influenza vaccine elsewhere  ( ) Patient received influenza vaccine during this hospitalization  ( x) Patient refused influenza vaccine at this time      Pt discharged with followings belongings: yes       Valuables sent home with patient. . Valuables retrieved from [employer] and returned to patient. yes Patient left department with staff via ambulatory discharged to selfcarePatient education on aftercare instructions: yes Patient verbalize understanding of AVS:  yes Suicidal Ideations? No AVH? HI?  Negative for homicidal ideation       Status EXAM upon discharge:  Status and Exam  Normal: Yes  Facial Expression: Brightened  Affect: Congruent  Level of Consciousness: Alert  Mood:Normal: Yes  Mood: Anxious  Motor Activity:Normal: Yes  Motor Activity: Increased  Interview Behavior: Cooperative  Preception: [place] to Person,[place] to Time,[place] to Place,[place] to Situation  Attention:Normal: Yes  Attention:  (adequate concentration)  Thought Processes:  (linear)  Thought Content:Normal: Yes  Thought Content:  (denies suicidal ideation)  Hallucinations: None  Delusions: No  Memory:Normal: Yes  Insight and Judgment: Yes  Insight and Judgment:  (improved insight and judgment)  Present Suicidal Ideation: No  Present Homicidal Ideation: No

## 2022-02-07 NOTE — PROGRESS NOTES
CLINICAL PHARMACY NOTE: MEDS TO BEDS    Total # of Prescriptions Filled: 5   The following medications were delivered to the patient:  · Vitamin D (Ergo)m 1.25 mg  · Hydroxyzine 25 mg  · Duloxetine 30 mg  · Olanzapine 10 mg  · Trazodone 50 mg    Additional Documentation:    Handed scripts to Cecily Del Toro (Trevor) at nurses station  Patient was a [employer]

## 2022-02-07 NOTE — PLAN OF CARE
Problem: Depressive Behavior With or Without Suicide Precautions:  Goal: Able to verbalize acceptance of life and situations over which he or she has no control  Description: Able to verbalize acceptance of life and situations over which he or she has no control  2/7/2022 1002 by Yani Ramirez, RN  Outcome: Completed                                                                       Group Therapy Note    Date: 2/7/2022  Start Time: 1000  End Time:  1045  Number of Participants: 9    Type of Group: Psychotherapy    Patient's Goal: Patient will process emotions and actions and how to relate to other or their with others. Patient discussed open communication and feelings and emotions. Notes:  Patient attended process group as scheduled, patient identified a issue to work on today regarding how they will interact and relate to others. Status After Intervention:  Improved    Participation Level:  Active Listener    Participation Quality: Appropriate, Attentive, and Sharing      Speech:  normal      Thought Process/Content: Logical      Affective Functioning: Congruent      Mood: euthymic      Level of consciousness:  Alert      Response to Learning: Able to verbalize current knowledge/experience      Endings: None Reported    Modes of Intervention: Education, Support, and Socialization      Discipline Responsible: /Counselor      Signature:  Alisa [place] [facility]

## 2022-02-07 NOTE — PLAN OF CARE
Problem: Depressive Behavior With or Without Suicide Precautions:  Goal: Able to verbalize acceptance of life and situations over which he or she has no control  Description: Able to verbalize acceptance of life and situations over which he or she has no control  Outcome: Ongoing  Goal: Able to verbalize and/or display a decrease in depressive symptoms  Description: Able to verbalize and/or display a decrease in depressive symptoms  Outcome: Met This Shift  Goal: Ability to disclose and discuss suicidal ideas will improve  Description: Ability to disclose and discuss suicidal ideas will improve  Outcome: Met This Shift  Goal: Able to verbalize support systems  Description: Able to verbalize support systems  Outcome: Ongoing  Goal: Absence of self-harm  Description: Absence of self-harm  Outcome: Met This Shift  Goal: Patient specific goal  Description: Patient specific goal  Outcome: Ongoing  Goal: Participates in care planning  Description: Participates in care planning  Outcome: Ongoing     Problem: Pain:  Goal: Pain level will decrease  Description: Pain level will decrease  Outcome: Ongoing  Goal: Control of acute pain  Description: Control of acute pain  Outcome: Ongoing  Goal: Control of chronic pain  Description: Control of chronic pain  Outcome: Ongoing

## 2022-02-07 NOTE — BH NOTE
[facility] Adult Unit Daily Assessment  Nursing Progress Note    Room: [facility]/602-01   Name: Samanta Reyna   Age: 29 y.o. Gender: female   Dx: Severe recurrent major depression with psychotic features ([address]  Precautions: suicide risk  Inpatient Status: voluntary       SLEEP:    Sleep Quality Fair  Sleep Medications: Yes and Melatonin 3 mg, zyprex 10 mg, Trazodone 50 mg  PRN Sleep Meds: No       MEDICAL:    Other PRN Meds: Yes and tylenol for back pain, Atarax for anxiety    Med Compliant: Yes  Accu-Chek: No  Oxygen/CPAP/BiPAP: No  CIWA/CINA: No   PAIN Assessment: present - adequately treated, location, upper back pain, receiving treatment or level of pain (1-10, 10 severe), 2  Side Effects from medication: No    Is Patient experiencing any respiratory symptoms (headache, fever, body aches, cough. Margret Neighbours ): no  Patient educated by nursing to practice social distancing, wear masks, wash hands frequently: yes      PSYCH:    Depression: 0   Anxiety: 2   SI denies suicidal ideation   HI Negative for homicidal ideation      AVH:Absent      GENERAL:    Appetite: no change from normal    Social: Yes and minimal   Speech: normal and pressured   Appearance: appropriately dressed and healthy looking    GROUP:    Group Participation: Yes  Participation Quality: Active Listener    Notes: pt in day area with peers and staff, affect is appropriate, behavior is calm and cooperative, pt is social with peers and staff, medication compliant,  Performs activities with other peers and staff. Pt has good appetite and fair sleep, pt denies depression and rates anxiety 2 on scale from one to ten with ten being the highest. Pt states that she denies SI,HI and AVH a this time. Pt states that medications are affective and that pt is feeling better and ready to go home. Pt states that pain on upper back was rated 2 on pain scale, admin Tylenol for pain. Pt is sleeping fair this shift. Will continue to monitor pt for safety and comfort.         Electronically signed by René Frias RN on 2/7/22 at 12:53 AM CST

## 2022-02-07 NOTE — PROGRESS NOTES
Group Therapy Note    Start Time: 800  End Time:  005  Number of Participants: 12    Type of Group: Community Meeting       Patient's Goal:       Notes:  Patient had no goals today    Participation Level:  Active Listener       Participation Quality: Appropriate      Thought Process/Content: Logical      Affective Functioning: Congruent      Mood: calm      Level of consciousness:  Alert      Modes of Intervention: Support      Discipline Responsible: Behavioral Health Tech II      Signature:  Katheryn Richmond

## 2022-02-07 NOTE — PLAN OF CARE
Group Therapy Note    Date: 2/7/2022  Start Time: 1000  End Time:  1030  Number of Participants: 11    Type of Group: Psychoeducation    Wellness Binder Information  Module Name:  1000  Session Number:  9506    Group Goal for Pt: To improve knowledge of relapse prevention strategies    Notes:  Pt demonstrated improved knowledge of relapse prevention strategies by actively participating in group discussion. Status After Intervention:  Unchanged    Participation Level:  Active Listener    Participation Quality: Appropriate and Attentive      Speech:  normal      Thought Process/Content: Logical      Affective Functioning: Congruent      Mood: anxious and depressed      Level of consciousness:  Alert and Oriented x4      Response to Learning: Able to verbalize current knowledge/experience, Able to verbalize/acknowledge new learning, and Progressing to goal      Endings: None Reported    Modes of Intervention: Education      Discipline Responsible: Psychoeducational Specialist      Signature:  Vince Feldman

## 2022-02-07 NOTE — PLAN OF CARE
Group Therapy Note     Date: 2/7/2022  Start Time: 1100  End Time:  1130  Number of Participants: 9     Type of Group: Psychoeducation     Wellness Binder Information  Module Name:  emotional wellness  Session Number:  1     Patient's Goal:  validation of feelings     Notes:  pt acknowledged to have feelings validated I may be necessary to share feeling with others.      Status After Intervention:  Improved     Participation Level: Interactive     Participation Quality: Appropriate, Attentive, and Sharing        Speech:  normal        Thought Process/Content: Logical        Affective Functioning: Congruent        Mood: congruent        Level of consciousness:  Alert, Oriented x4, and Attentive        Response to Learning: Able to verbalize current knowledge/experience        Endings: None Reported     Modes of Intervention: Education        Discipline Responsible: Psychoeducational Specialist        Signature:  Lazaro Doran

## 2022-02-07 NOTE — PROGRESS NOTES
Treatment Team Note:     SW met with [address] team to discuss Pts TX and DC plans.      Progress/Behavior/Group Attendance: TBD     Target Symptoms/Reason for admission: Patient admitted to [facility] due to under the care of   arrived on unit via [address] with security and staff from [place] Patient arrived dressed in paper scrubs:  no.   Body assessment and safety check completed by Megan and Jossy Miramontes  yes contraband discovered shoes with string, pants with string, and hoodie with string  Pt was provided with paper scrubs and clothes from donation closet.    Patient belongings and valuables was cataloged and accounted for by Neeru MILTON  Diagnoses: Severe recurrent major depression with psychotic features   Cannabis use disorder, moderate, dependence , Stimulant use disorder   UDS: Amphetamine, Methampetamine, THC  BAL:  Neg        AftercarePlan: [facility]     Pt lives with: mom     Collateral obtained from: mom  On:2/4/22     Family Session: TBA     Misc:

## 2024-12-18 NOTE — PROGRESS NOTES
Pap is sent with STD screen and HPV.  Regular exercise and attaining/maintaining a healthy weight is encouraged.   Adequate calcium intake with diet or supplements is encouraged.    We will notify of any abnormal results.    Treatment Team Note:     SW met with [address] team to discuss Pts [place] and DC plans.      Progress/Behavior/Group Attendance: TBD     Target Symptoms/Reason for admission: Patient admitted to [facility] due to under the care of   arrived on unit via [address] with security and staff from [place] Patient arrived dressed in paper scrubs:  no.   Body assessment and safety check completed by Megan and Jim Carpenter  yes mathieuaband discovered shoes with string, pants with string, and hoodie with string  Pt was provided with paper scrubs and clothes from donation closet.    Patient belongings and valuables was cataloged and accounted for by Neeru MILTON  Diagnoses: Severe recurrent major depression with psychotic features   Cannabis use disorder, moderate, dependence , Stimulant use disorder   UDS: Amphetamine, Methampetamine, THC  BAL:  Neg        AftercarePlan: [facility]     Pt lives with: mom     Collateral obtained from: mom  On:2/4/22     Family Session: TBA     Misc: